# Patient Record
Sex: MALE | Race: WHITE | NOT HISPANIC OR LATINO | Employment: FULL TIME | URBAN - METROPOLITAN AREA
[De-identification: names, ages, dates, MRNs, and addresses within clinical notes are randomized per-mention and may not be internally consistent; named-entity substitution may affect disease eponyms.]

---

## 2022-02-22 ENCOUNTER — CLINICAL SUPPORT (OUTPATIENT)
Dept: OTHER | Facility: CLINIC | Age: 47
End: 2022-02-22

## 2022-02-22 DIAGNOSIS — Z00.8 ENCOUNTER FOR OTHER GENERAL EXAMINATION: ICD-10-CM

## 2022-02-24 VITALS — HEIGHT: 74 IN

## 2022-02-24 LAB
GLUCOSE SERPL-MCNC: 112 MG/DL (ref 60–140)
HDLC SERPL-MCNC: 42 MG/DL
POC CHOLESTEROL, LDL (DOCK): 113 MG/DL
POC CHOLESTEROL, TOTAL: 180 MG/DL
TRIGL SERPL-MCNC: 131 MG/DL

## 2023-09-26 DIAGNOSIS — M25.569 KNEE PAIN, UNSPECIFIED CHRONICITY, UNSPECIFIED LATERALITY: Primary | ICD-10-CM

## 2023-09-27 ENCOUNTER — OFFICE VISIT (OUTPATIENT)
Dept: ORTHOPEDICS | Facility: CLINIC | Age: 48
End: 2023-09-27
Payer: COMMERCIAL

## 2023-09-27 ENCOUNTER — HOSPITAL ENCOUNTER (OUTPATIENT)
Dept: RADIOLOGY | Facility: HOSPITAL | Age: 48
Discharge: HOME | End: 2023-09-27
Attending: ORTHOPAEDIC SURGERY
Payer: COMMERCIAL

## 2023-09-27 VITALS — RESPIRATION RATE: 18 BRPM | HEIGHT: 74 IN | BODY MASS INDEX: 25.03 KG/M2 | WEIGHT: 195 LBS

## 2023-09-27 DIAGNOSIS — S83.512A CHRONIC RUPTURE OF ACL OF LEFT KNEE: Primary | ICD-10-CM

## 2023-09-27 DIAGNOSIS — M25.569 KNEE PAIN, UNSPECIFIED CHRONICITY, UNSPECIFIED LATERALITY: ICD-10-CM

## 2023-09-27 DIAGNOSIS — M25.562 LEFT KNEE PAIN, UNSPECIFIED CHRONICITY: ICD-10-CM

## 2023-09-27 DIAGNOSIS — S83.282A TEAR OF LATERAL MENISCUS OF LEFT KNEE, UNSPECIFIED TEAR TYPE, UNSPECIFIED WHETHER OLD OR CURRENT TEAR, INITIAL ENCOUNTER: Primary | ICD-10-CM

## 2023-09-27 DIAGNOSIS — S83.282A ACUTE LATERAL MENISCAL TEAR, LEFT, INITIAL ENCOUNTER: ICD-10-CM

## 2023-09-27 PROCEDURE — 99999 PR PBB SHADOW E&M-EST. PATIENT-LVL III: CPT | Mod: PBBFAC,,, | Performed by: ORTHOPAEDIC SURGERY

## 2023-09-27 PROCEDURE — 73562 X-RAY EXAM OF KNEE 3: CPT | Mod: 59,TC,PO,RT

## 2023-09-27 PROCEDURE — 99999 PR PBB SHADOW E&M-EST. PATIENT-LVL III: ICD-10-PCS | Mod: PBBFAC,,, | Performed by: ORTHOPAEDIC SURGERY

## 2023-09-27 PROCEDURE — 99204 OFFICE O/P NEW MOD 45 MIN: CPT | Mod: S$GLB,,, | Performed by: ORTHOPAEDIC SURGERY

## 2023-09-27 PROCEDURE — 3008F BODY MASS INDEX DOCD: CPT | Mod: CPTII,S$GLB,, | Performed by: ORTHOPAEDIC SURGERY

## 2023-09-27 PROCEDURE — 99204 PR OFFICE/OUTPT VISIT, NEW, LEVL IV, 45-59 MIN: ICD-10-PCS | Mod: S$GLB,,, | Performed by: ORTHOPAEDIC SURGERY

## 2023-09-27 PROCEDURE — 73562 X-RAY EXAM OF KNEE 3: CPT | Mod: 26,RT,, | Performed by: RADIOLOGY

## 2023-09-27 PROCEDURE — 73562 XR KNEE ORTHO LEFT WITH FLEXION: ICD-10-PCS | Mod: 26,RT,, | Performed by: RADIOLOGY

## 2023-09-27 PROCEDURE — 1159F PR MEDICATION LIST DOCUMENTED IN MEDICAL RECORD: ICD-10-PCS | Mod: CPTII,S$GLB,, | Performed by: ORTHOPAEDIC SURGERY

## 2023-09-27 PROCEDURE — 3008F PR BODY MASS INDEX (BMI) DOCUMENTED: ICD-10-PCS | Mod: CPTII,S$GLB,, | Performed by: ORTHOPAEDIC SURGERY

## 2023-09-27 PROCEDURE — 73564 XR KNEE ORTHO LEFT WITH FLEXION: ICD-10-PCS | Mod: 26,LT,, | Performed by: RADIOLOGY

## 2023-09-27 PROCEDURE — 1159F MED LIST DOCD IN RCRD: CPT | Mod: CPTII,S$GLB,, | Performed by: ORTHOPAEDIC SURGERY

## 2023-09-27 PROCEDURE — 73564 X-RAY EXAM KNEE 4 OR MORE: CPT | Mod: 26,LT,, | Performed by: RADIOLOGY

## 2023-09-27 NOTE — PROGRESS NOTES
Past Medical History:   Diagnosis Date    Exercise-induced asthma 1/16/2012    GERD (gastroesophageal reflux disease) 1/16/2012    Seasonal allergies 1/16/2012       Past Surgical History:   Procedure Laterality Date    Knee surgery x2         Current Outpatient Medications   Medication Sig    cholecalciferol, vitamin D3, (VITAMIN D3) 25 mcg (1,000 unit) capsule Take 1,000 Units by mouth once daily.    ergocalciferol, vitamin D2, (VITAMIN D2 ORAL) Take by mouth.    omega-3 fatty acids/fish oil (FISH OIL-OMEGA-3 FATTY ACIDS) 300-1,000 mg capsule Take by mouth once daily.     No current facility-administered medications for this visit.       Review of patient's allergies indicates:  No Known Allergies    Family History   Problem Relation Age of Onset    Cancer Father     Asthma Sister        Social History     Socioeconomic History    Marital status:    Tobacco Use    Smoking status: Former    Smokeless tobacco: Never    Tobacco comments:     The patient smoked occasionally. Has not smoked in over a year.   Substance and Sexual Activity    Alcohol use: Yes     Comment: 3-4 times weekly    Drug use: No    Sexual activity: Yes     Partners: Female       Chief Complaint: No chief complaint on file.      History of present illness:  47-year-old male seen for left knee pain.  Patient has a history of an attempted ACL repair when he was an adolescent.  Patient's growth plates were still open so they could not do a reconstruction.  Unfortunately, the patient then developed an infection and they had to remove everything.  Patient has had worsening instability and swelling over the last few months.  Can not play basketball or run anymore.  Pain is up to 4/10.  Most of his pain is laterally located.      Review of Systems:    Constitution: Negative for chills, fever, and sweats.  Negative for unexplained weight loss.    HENT:  Negative for headaches and blurry vision.    Cardiovascular:Negative for chest pain or  irregular heart beat. Negative for hypertension.    Respiratory:  Negative for cough and shortness of breath.    Gastrointestinal: Negative for abdominal pain, heartburn, melena, nausea, and vomitting.    Genitourinary:  Negative bladder incontinence and dysuria.    Musculoskeletal:  See HPI    Neurological: Negative for numbness.    Psychiatric/Behavioral: Negative for depression.  The patient is not nervous/anxious.      Endocrine: Negative for polyuria    Hematologic/Lymphatic: Negative for bleeding problem.  Does not bruise/bleed easily.    Skin: Negative for poor would healing and rash      Physical Examination:    Vital Signs:  There were no vitals filed for this visit.    There is no height or weight on file to calculate BMI.    This a well-developed, well nourished patient in no acute distress.  They are alert and oriented and cooperative to examination.  Pt. walks without an antalgic gait.      Examination of the left knee shows no rashes or erythema. There are no masses ecchymosis or effusion.  Healed lateral surgical scar along the lateral aspect of his knee.  Patient has full range of motion from 0-130°. Patient is nontender to palpation over lateral joint line and markedly tender to palpation over the medial joint line. Patient has a 1+ Lachman exam, - anterior drawer exam, and - posterior drawer exam.  Positive lateral Apley exam. Knee is stable to varus and valgus stress. 5 out of 5 motor strength. Palpable distal pulses. Intact light touch sensation. Negative Patellofemoral crepitus      X-rays:  X-rays left knee are ordered and reviewed which show some heterotopic ossification along the lateral distal femur likely from prior surgical drilling.  Some mild to moderate medial narrowing.  Right greater than left.     Assessment::  Left lateral meniscal tear   Chronic left ACL instability    Plan:  I reviewed the findings with him today.  I recommended an MRI to evaluate particularly the lateral  meniscus.  Patient has a chronic unstable left knee from ACL injury as a child.  He now has worsening symptoms such as swelling and instability.  Follow-up after the MRI is completed.    All previous pertinent notes including ER visits, physical therapy visits, other orthopedic visits as well as other care for the same musculoskeletal problem were reviewed.  All pertinent lab values and previous imaging was reviewed pertinent to the current visit.    This note was created using [employer] voice recognition software that occasionally misinterpreted phrases or words.    Consult note is delivered via Epic messaging service.

## 2023-10-11 ENCOUNTER — OFFICE VISIT (OUTPATIENT)
Dept: ORTHOPEDICS | Facility: CLINIC | Age: 48
End: 2023-10-11
Payer: COMMERCIAL

## 2023-10-11 VITALS — WEIGHT: 195 LBS | RESPIRATION RATE: 18 BRPM | BODY MASS INDEX: 25.03 KG/M2 | HEIGHT: 74 IN

## 2023-10-11 DIAGNOSIS — S83.512A CHRONIC RUPTURE OF ACL OF LEFT KNEE: ICD-10-CM

## 2023-10-11 DIAGNOSIS — S83.282A TEAR OF LATERAL MENISCUS OF LEFT KNEE, UNSPECIFIED TEAR TYPE, UNSPECIFIED WHETHER OLD OR CURRENT TEAR, INITIAL ENCOUNTER: Primary | ICD-10-CM

## 2023-10-11 PROCEDURE — 1159F PR MEDICATION LIST DOCUMENTED IN MEDICAL RECORD: ICD-10-PCS | Mod: CPTII,S$GLB,, | Performed by: ORTHOPAEDIC SURGERY

## 2023-10-11 PROCEDURE — 3008F BODY MASS INDEX DOCD: CPT | Mod: CPTII,S$GLB,, | Performed by: ORTHOPAEDIC SURGERY

## 2023-10-11 PROCEDURE — 1159F MED LIST DOCD IN RCRD: CPT | Mod: CPTII,S$GLB,, | Performed by: ORTHOPAEDIC SURGERY

## 2023-10-11 PROCEDURE — 99999 PR PBB SHADOW E&M-EST. PATIENT-LVL III: CPT | Mod: PBBFAC,,, | Performed by: ORTHOPAEDIC SURGERY

## 2023-10-11 PROCEDURE — 99999 PR PBB SHADOW E&M-EST. PATIENT-LVL III: ICD-10-PCS | Mod: PBBFAC,,, | Performed by: ORTHOPAEDIC SURGERY

## 2023-10-11 PROCEDURE — 3044F HG A1C LEVEL LT 7.0%: CPT | Mod: CPTII,S$GLB,, | Performed by: ORTHOPAEDIC SURGERY

## 2023-10-11 PROCEDURE — 99214 OFFICE O/P EST MOD 30 MIN: CPT | Mod: 57,S$GLB,, | Performed by: ORTHOPAEDIC SURGERY

## 2023-10-11 PROCEDURE — 99214 PR OFFICE/OUTPT VISIT, EST, LEVL IV, 30-39 MIN: ICD-10-PCS | Mod: 57,S$GLB,, | Performed by: ORTHOPAEDIC SURGERY

## 2023-10-11 PROCEDURE — 3044F PR MOST RECENT HEMOGLOBIN A1C LEVEL <7.0%: ICD-10-PCS | Mod: CPTII,S$GLB,, | Performed by: ORTHOPAEDIC SURGERY

## 2023-10-11 PROCEDURE — 3008F PR BODY MASS INDEX (BMI) DOCUMENTED: ICD-10-PCS | Mod: CPTII,S$GLB,, | Performed by: ORTHOPAEDIC SURGERY

## 2023-10-11 NOTE — H&P (VIEW-ONLY)
Past Medical History:   Diagnosis Date    Exercise-induced asthma 1/16/2012    GERD (gastroesophageal reflux disease) 1/16/2012    Seasonal allergies 1/16/2012       Past Surgical History:   Procedure Laterality Date    Knee surgery x2         Current Outpatient Medications   Medication Sig    cholecalciferol, vitamin D3, (VITAMIN D3) 25 mcg (1,000 unit) capsule Take 1,000 Units by mouth once daily.    ergocalciferol, vitamin D2, (VITAMIN D2 ORAL) Take by mouth.    omega-3 fatty acids/fish oil (FISH OIL-OMEGA-3 FATTY ACIDS) 300-1,000 mg capsule Take by mouth once daily.     No current facility-administered medications for this visit.       Review of patient's allergies indicates:  No Known Allergies    Family History   Problem Relation Age of Onset    Cancer Father     Asthma Sister        Social History     Socioeconomic History    Marital status:    Tobacco Use    Smoking status: Former    Smokeless tobacco: Never    Tobacco comments:     The patient smoked occasionally. Has not smoked in over a year.   Substance and Sexual Activity    Alcohol use: Yes     Comment: 3-4 times weekly    Drug use: No    Sexual activity: Yes     Partners: Female       Chief Complaint: No chief complaint on file.      History of present illness:  47-year-old male seen for left knee pain.  Patient ha\s a history of an attempted ACL repair when he was an adolescent.  Patient's growth plates were still open so they could not do a reconstruction.  Unfortunately, the patient then developed an infection and they had to remove everything.  Patient has had worsening instability and swelling over the last few months.  Can not play basketball or run anymore.  Pain is up to 4/10.  Most of his pain is laterally located.      Review of Systems:  Musculoskeletal:  See HPI    Physical Examination:    Vital Signs:  There were no vitals filed for this visit.    There is no height or weight on file to calculate BMI.    This a well-developed, well  nourished patient in no acute distress.  They are alert and oriented and cooperative to examination.  Pt. walks without an antalgic gait.      Examination of the left knee shows no rashes or erythema. There are no masses ecchymosis or effusion.  Healed lateral surgical scar along the lateral aspect of his knee.  Patient has full range of motion from 0-130°. Patient is nontender to palpation over lateral joint line and markedly tender to palpation over the medial joint line. Patient has a 1+ Lachman exam, - anterior drawer exam, and - posterior drawer exam.  Positive lateral Apley exam. Knee is stable to varus and valgus stress. 5 out of 5 motor strength. Palpable distal pulses. Intact light touch sensation. Negative Patellofemoral crepitus    Heart is regular rate without obvious murmurs   Normal respiratory effort without audible wheezing  Abdomen is soft and nontender     X-rays:  X-rays left knee are  reviewed which show some heterotopic ossification along the lateral distal femur likely from prior surgical drilling.  Some mild to moderate medial narrowing.  Right greater than left.    MRI of the left knee is reviewed and interpreted:1. Complex tear of the posterior horn and body of the lateral meniscus.  2. Questionable tear of the medial meniscus anterior root.  There is no meniscal extrusion.  3. Thickening and increased signal of the anterior cruciate ligament could be secondary to age-indeterminate partial tear or sprain.     Assessment::  Left lateral meniscal tear   Possible root ligament medial meniscal tear  Chronic left ACL instability    Plan:  I reviewed the findings with him today.    Patient has a chronic unstable left knee from ACL injury as a child.  He now has worsening symptoms such as swelling and instability.  Talked about addressing the meniscus injuries.  I do not think he has a true root ligament injury of the medial meniscus as there is no extrusion and he does not have any medial joint  line pain.  Plan is for left knee arthroscopy with partial medial meniscectomy.  Risks, benefits, and alternatives to the procedure were explained to the patient including but not limited to damage to nerves, arteries, blood vessels, bones, tendons, ligaments, stiffness, instability, infection, permanent limb dysfunction, DVT, PE, as well as general anesthetic complications including seizure, stroke, heart attack and even death. The patient understood these risks and wished to proceed and signed the informed consent.       All previous pertinent notes including ER visits, physical therapy visits, other orthopedic visits as well as other care for the same musculoskeletal problem were reviewed.  All pertinent lab values and previous imaging was reviewed pertinent to the current visit.    This note was created using [employer] voice recognition software that occasionally misinterpreted phrases or words.    Consult note is delivered via Epic messaging service.

## 2023-10-11 NOTE — PROGRESS NOTES
Past Medical History:   Diagnosis Date    Exercise-induced asthma 1/16/2012    GERD (gastroesophageal reflux disease) 1/16/2012    Seasonal allergies 1/16/2012       Past Surgical History:   Procedure Laterality Date    Knee surgery x2         Current Outpatient Medications   Medication Sig    cholecalciferol, vitamin D3, (VITAMIN D3) 25 mcg (1,000 unit) capsule Take 1,000 Units by mouth once daily.    ergocalciferol, vitamin D2, (VITAMIN D2 ORAL) Take by mouth.    omega-3 fatty acids/fish oil (FISH OIL-OMEGA-3 FATTY ACIDS) 300-1,000 mg capsule Take by mouth once daily.     No current facility-administered medications for this visit.       Review of patient's allergies indicates:  No Known Allergies    Family History   Problem Relation Age of Onset    Cancer Father     Asthma Sister        Social History     Socioeconomic History    Marital status:    Tobacco Use    Smoking status: Former    Smokeless tobacco: Never    Tobacco comments:     The patient smoked occasionally. Has not smoked in over a year.   Substance and Sexual Activity    Alcohol use: Yes     Comment: 3-4 times weekly    Drug use: No    Sexual activity: Yes     Partners: Female       Chief Complaint: No chief complaint on file.      History of present illness:  47-year-old male seen for left knee pain.  Patient ha\s a history of an attempted ACL repair when he was an adolescent.  Patient's growth plates were still open so they could not do a reconstruction.  Unfortunately, the patient then developed an infection and they had to remove everything.  Patient has had worsening instability and swelling over the last few months.  Can not play basketball or run anymore.  Pain is up to 4/10.  Most of his pain is laterally located.      Review of Systems:  Musculoskeletal:  See HPI    Physical Examination:    Vital Signs:  There were no vitals filed for this visit.    There is no height or weight on file to calculate BMI.    This a well-developed, well  nourished patient in no acute distress.  They are alert and oriented and cooperative to examination.  Pt. walks without an antalgic gait.      Examination of the left knee shows no rashes or erythema. There are no masses ecchymosis or effusion.  Healed lateral surgical scar along the lateral aspect of his knee.  Patient has full range of motion from 0-130°. Patient is nontender to palpation over lateral joint line and markedly tender to palpation over the medial joint line. Patient has a 1+ Lachman exam, - anterior drawer exam, and - posterior drawer exam.  Positive lateral Apley exam. Knee is stable to varus and valgus stress. 5 out of 5 motor strength. Palpable distal pulses. Intact light touch sensation. Negative Patellofemoral crepitus    Heart is regular rate without obvious murmurs   Normal respiratory effort without audible wheezing  Abdomen is soft and nontender     X-rays:  X-rays left knee are  reviewed which show some heterotopic ossification along the lateral distal femur likely from prior surgical drilling.  Some mild to moderate medial narrowing.  Right greater than left.    MRI of the left knee is reviewed and interpreted:1. Complex tear of the posterior horn and body of the lateral meniscus.  2. Questionable tear of the medial meniscus anterior root.  There is no meniscal extrusion.  3. Thickening and increased signal of the anterior cruciate ligament could be secondary to age-indeterminate partial tear or sprain.     Assessment::  Left lateral meniscal tear   Possible root ligament medial meniscal tear  Chronic left ACL instability    Plan:  I reviewed the findings with him today.    Patient has a chronic unstable left knee from ACL injury as a child.  He now has worsening symptoms such as swelling and instability.  Talked about addressing the meniscus injuries.  I do not think he has a true root ligament injury of the medial meniscus as there is no extrusion and he does not have any medial joint  line pain.  Plan is for left knee arthroscopy with partial medial meniscectomy.  Risks, benefits, and alternatives to the procedure were explained to the patient including but not limited to damage to nerves, arteries, blood vessels, bones, tendons, ligaments, stiffness, instability, infection, permanent limb dysfunction, DVT, PE, as well as general anesthetic complications including seizure, stroke, heart attack and even death. The patient understood these risks and wished to proceed and signed the informed consent.       All previous pertinent notes including ER visits, physical therapy visits, other orthopedic visits as well as other care for the same musculoskeletal problem were reviewed.  All pertinent lab values and previous imaging was reviewed pertinent to the current visit.    This note was created using [employer] voice recognition software that occasionally misinterpreted phrases or words.    Consult note is delivered via Epic messaging service.

## 2023-10-13 DIAGNOSIS — S83.282A ACUTE LATERAL MENISCUS TEAR OF LEFT KNEE: ICD-10-CM

## 2023-10-13 DIAGNOSIS — S83.282A TEAR OF LATERAL MENISCUS OF LEFT KNEE, UNSPECIFIED TEAR TYPE, UNSPECIFIED WHETHER OLD OR CURRENT TEAR, INITIAL ENCOUNTER: Primary | ICD-10-CM

## 2023-10-13 RX ORDER — SODIUM CHLORIDE 9 MG/ML
INJECTION, SOLUTION INTRAVENOUS ONCE
Status: CANCELLED | OUTPATIENT
Start: 2023-10-13 | End: 2023-10-13

## 2023-10-25 ENCOUNTER — TELEPHONE (OUTPATIENT)
Dept: ORTHOPEDICS | Facility: CLINIC | Age: 48
End: 2023-10-25
Payer: COMMERCIAL

## 2023-10-25 NOTE — TELEPHONE ENCOUNTER
----- Message from Laurence Trimble LPN sent at 10/25/2023  2:10 PM CDT -----  Contact: pt [phone]    ----- Message -----  From: Jeanette Schmitt  Sent: 10/25/2023   2:08 PM CDT  To: [facility]ale[facility] Staff    Type: Needs Medical Advice  Who Called: Pt     Best Call Back Number: [phone]    Additional Information: [facility] [place] Pls call back and advise

## 2023-11-06 ENCOUNTER — PATIENT MESSAGE (OUTPATIENT)
Dept: SURGERY | Facility: HOSPITAL | Age: 48
End: 2023-11-06
Payer: COMMERCIAL

## 2023-11-07 ENCOUNTER — TELEPHONE (OUTPATIENT)
Dept: SURGERY | Facility: HOSPITAL | Age: 48
End: 2023-11-07
Payer: COMMERCIAL

## 2023-11-07 RX ORDER — ZINC GLUCONATE 100 MG
TABLET ORAL DAILY
COMMUNITY

## 2023-11-07 NOTE — TELEPHONE ENCOUNTER
Good afternoon,     Mr. Devries had a PT/INR and APTT drawn on 11/2/23. He is wondering if Dr. Dukes requires any further lab work prior to his upcoming surgery on Friday, 11/10. Please contact Mr. Devries to discuss.     Thank you!

## 2023-11-09 ENCOUNTER — ANESTHESIA EVENT (OUTPATIENT)
Dept: SURGERY | Facility: HOSPITAL | Age: 48
End: 2023-11-09
Payer: COMMERCIAL

## 2023-11-10 ENCOUNTER — ANESTHESIA (OUTPATIENT)
Dept: SURGERY | Facility: HOSPITAL | Age: 48
End: 2023-11-10
Payer: COMMERCIAL

## 2023-11-10 ENCOUNTER — HOSPITAL ENCOUNTER (OUTPATIENT)
Facility: HOSPITAL | Age: 48
Discharge: HOME | End: 2023-11-10
Attending: ORTHOPAEDIC SURGERY | Admitting: ORTHOPAEDIC SURGERY
Payer: COMMERCIAL

## 2023-11-10 DIAGNOSIS — S83.282A TEAR OF LATERAL MENISCUS OF LEFT KNEE, UNSPECIFIED TEAR TYPE, UNSPECIFIED WHETHER OLD OR CURRENT TEAR, INITIAL ENCOUNTER: ICD-10-CM

## 2023-11-10 DIAGNOSIS — S83.282A ACUTE LATERAL MENISCUS TEAR OF LEFT KNEE: ICD-10-CM

## 2023-11-10 PROCEDURE — 25000003 PHARM REV CODE 250: Mod: PO | Performed by: ORTHOPAEDIC SURGERY

## 2023-11-10 PROCEDURE — 71000016 HC POSTOP RECOV ADDL HR: Mod: PO | Performed by: ORTHOPAEDIC SURGERY

## 2023-11-10 PROCEDURE — 71000015 HC POSTOP RECOV 1ST HR: Mod: PO | Performed by: ORTHOPAEDIC SURGERY

## 2023-11-10 PROCEDURE — D9220A PRA ANESTHESIA: ICD-10-PCS | Mod: ANES,,, | Performed by: ANESTHESIOLOGY

## 2023-11-10 PROCEDURE — 37000008 HC ANESTHESIA 1ST 15 MINUTES: Mod: PO | Performed by: ORTHOPAEDIC SURGERY

## 2023-11-10 PROCEDURE — 29881 PR KNEE SCOPE SINGLE MENISECECTOMY: ICD-10-PCS | Mod: LT,,, | Performed by: ORTHOPAEDIC SURGERY

## 2023-11-10 PROCEDURE — 63600175 PHARM REV CODE 636 W HCPCS: Mod: PO | Performed by: ANESTHESIOLOGY

## 2023-11-10 PROCEDURE — 63600175 PHARM REV CODE 636 W HCPCS: Mod: PO | Performed by: ORTHOPAEDIC SURGERY

## 2023-11-10 PROCEDURE — 63600175 PHARM REV CODE 636 W HCPCS: Mod: PO | Performed by: NURSE ANESTHETIST, CERTIFIED REGISTERED

## 2023-11-10 PROCEDURE — D9220A PRA ANESTHESIA: Mod: ANES,,, | Performed by: ANESTHESIOLOGY

## 2023-11-10 PROCEDURE — 27200651 HC AIRWAY, LMA: Mod: PO | Performed by: ANESTHESIOLOGY

## 2023-11-10 PROCEDURE — D9220A PRA ANESTHESIA: Mod: CRNA,,, | Performed by: NURSE ANESTHETIST, CERTIFIED REGISTERED

## 2023-11-10 PROCEDURE — D9220A PRA ANESTHESIA: ICD-10-PCS | Mod: CRNA,,, | Performed by: NURSE ANESTHETIST, CERTIFIED REGISTERED

## 2023-11-10 PROCEDURE — 36000710: Mod: PO | Performed by: ORTHOPAEDIC SURGERY

## 2023-11-10 PROCEDURE — 29881 ARTHRS KNE SRG MNISECTMY M/L: CPT | Mod: LT,,, | Performed by: ORTHOPAEDIC SURGERY

## 2023-11-10 PROCEDURE — 71000033 HC RECOVERY, INTIAL HOUR: Mod: PO | Performed by: ORTHOPAEDIC SURGERY

## 2023-11-10 PROCEDURE — 27201423 OPTIME MED/SURG SUP & DEVICES STERILE SUPPLY: Mod: PO | Performed by: ORTHOPAEDIC SURGERY

## 2023-11-10 PROCEDURE — 36000711: Mod: PO | Performed by: ORTHOPAEDIC SURGERY

## 2023-11-10 PROCEDURE — 25000003 PHARM REV CODE 250: Mod: PO | Performed by: NURSE ANESTHETIST, CERTIFIED REGISTERED

## 2023-11-10 PROCEDURE — 37000009 HC ANESTHESIA EA ADD 15 MINS: Mod: PO | Performed by: ORTHOPAEDIC SURGERY

## 2023-11-10 RX ORDER — HYDROMORPHONE HYDROCHLORIDE 2 MG/ML
0.2 INJECTION, SOLUTION INTRAMUSCULAR; INTRAVENOUS; SUBCUTANEOUS EVERY 5 MIN PRN
Status: ACTIVE | OUTPATIENT
Start: 2023-11-10

## 2023-11-10 RX ORDER — HYDROCODONE BITARTRATE AND ACETAMINOPHEN 5; 325 MG/1; MG/1
1 TABLET ORAL EVERY 4 HOURS PRN
Status: DISCONTINUED | OUTPATIENT
Start: 2023-11-10 | End: 2023-11-10 | Stop reason: HOSPADM

## 2023-11-10 RX ORDER — MEPERIDINE HYDROCHLORIDE 50 MG/ML
12.5 INJECTION INTRAMUSCULAR; INTRAVENOUS; SUBCUTANEOUS ONCE
Status: ACTIVE | OUTPATIENT
Start: 2023-11-10 | End: 2023-11-11

## 2023-11-10 RX ORDER — ACETAMINOPHEN 500 MG
1000 TABLET ORAL EVERY 8 HOURS PRN
Qty: 30 TABLET | Refills: 0 | Status: SHIPPED | OUTPATIENT
Start: 2023-11-10 | End: 2023-11-22

## 2023-11-10 RX ORDER — ACETAMINOPHEN 10 MG/ML
INJECTION, SOLUTION INTRAVENOUS
Status: DISCONTINUED | OUTPATIENT
Start: 2023-11-10 | End: 2023-11-10

## 2023-11-10 RX ORDER — PROCHLORPERAZINE EDISYLATE 5 MG/ML
5 INJECTION INTRAMUSCULAR; INTRAVENOUS EVERY 4 HOURS PRN
Status: ACTIVE | OUTPATIENT
Start: 2023-11-10

## 2023-11-10 RX ORDER — SODIUM CHLORIDE 9 MG/ML
INJECTION, SOLUTION INTRAVENOUS ONCE
Status: DISCONTINUED | OUTPATIENT
Start: 2023-11-10 | End: 2023-11-10 | Stop reason: HOSPADM

## 2023-11-10 RX ORDER — BUPIVACAINE HYDROCHLORIDE AND EPINEPHRINE 5; 5 MG/ML; UG/ML
INJECTION, SOLUTION EPIDURAL; INTRACAUDAL; PERINEURAL
Status: DISCONTINUED | OUTPATIENT
Start: 2023-11-10 | End: 2023-11-10 | Stop reason: HOSPADM

## 2023-11-10 RX ORDER — LIDOCAINE HYDROCHLORIDE 10 MG/ML
1 INJECTION, SOLUTION EPIDURAL; INFILTRATION; INTRACAUDAL; PERINEURAL ONCE
Status: ACTIVE | OUTPATIENT
Start: 2023-11-10

## 2023-11-10 RX ORDER — IBUPROFEN 800 MG/1
800 TABLET ORAL EVERY 6 HOURS PRN
Qty: 30 TABLET | Refills: 0 | Status: SHIPPED | OUTPATIENT
Start: 2023-11-10 | End: 2023-12-20

## 2023-11-10 RX ORDER — OXYCODONE HYDROCHLORIDE 5 MG/1
5 TABLET ORAL
Status: ACTIVE | OUTPATIENT
Start: 2023-11-10

## 2023-11-10 RX ORDER — ONDANSETRON 4 MG/1
4 TABLET, ORALLY DISINTEGRATING ORAL EVERY 8 HOURS PRN
Qty: 30 TABLET | Refills: 0 | Status: SHIPPED | OUTPATIENT
Start: 2023-11-10 | End: 2023-11-22

## 2023-11-10 RX ORDER — ONDANSETRON 2 MG/ML
INJECTION INTRAMUSCULAR; INTRAVENOUS
Status: DISCONTINUED | OUTPATIENT
Start: 2023-11-10 | End: 2023-11-10

## 2023-11-10 RX ORDER — MIDAZOLAM HYDROCHLORIDE 1 MG/ML
INJECTION, SOLUTION INTRAMUSCULAR; INTRAVENOUS
Status: DISCONTINUED | OUTPATIENT
Start: 2023-11-10 | End: 2023-11-10

## 2023-11-10 RX ORDER — SODIUM CHLORIDE 0.9 G/100ML
IRRIGANT IRRIGATION
Status: DISCONTINUED | OUTPATIENT
Start: 2023-11-10 | End: 2023-11-10 | Stop reason: HOSPADM

## 2023-11-10 RX ORDER — SODIUM CHLORIDE 0.9 % (FLUSH) 0.9 %
10 SYRINGE (ML) INJECTION ONCE
Status: DISPENSED | OUTPATIENT
Start: 2023-11-10

## 2023-11-10 RX ORDER — PROPOFOL 10 MG/ML
VIAL (ML) INTRAVENOUS
Status: DISCONTINUED | OUTPATIENT
Start: 2023-11-10 | End: 2023-11-10

## 2023-11-10 RX ORDER — KETOROLAC TROMETHAMINE 30 MG/ML
INJECTION, SOLUTION INTRAMUSCULAR; INTRAVENOUS
Status: DISCONTINUED | OUTPATIENT
Start: 2023-11-10 | End: 2023-11-10

## 2023-11-10 RX ORDER — CYCLOBENZAPRINE HCL 10 MG
10 TABLET ORAL 3 TIMES DAILY PRN
Qty: 30 TABLET | Refills: 0 | Status: SHIPPED | OUTPATIENT
Start: 2023-11-10 | End: 2023-11-22

## 2023-11-10 RX ORDER — SODIUM CHLORIDE, SODIUM LACTATE, POTASSIUM CHLORIDE, CALCIUM CHLORIDE 600; 310; 30; 20 MG/100ML; MG/100ML; MG/100ML; MG/100ML
INJECTION, SOLUTION INTRAVENOUS CONTINUOUS
Status: DISPENSED | OUTPATIENT
Start: 2023-11-10

## 2023-11-10 RX ORDER — FENTANYL CITRATE 50 UG/ML
INJECTION, SOLUTION INTRAMUSCULAR; INTRAVENOUS
Status: DISCONTINUED | OUTPATIENT
Start: 2023-11-10 | End: 2023-11-10

## 2023-11-10 RX ORDER — LIDOCAINE HYDROCHLORIDE 20 MG/ML
INJECTION INTRAVENOUS
Status: DISCONTINUED | OUTPATIENT
Start: 2023-11-10 | End: 2023-11-10

## 2023-11-10 RX ORDER — DEXAMETHASONE SODIUM PHOSPHATE 4 MG/ML
INJECTION, SOLUTION INTRA-ARTICULAR; INTRALESIONAL; INTRAMUSCULAR; INTRAVENOUS; SOFT TISSUE
Status: DISCONTINUED | OUTPATIENT
Start: 2023-11-10 | End: 2023-11-10

## 2023-11-10 RX ORDER — ASPIRIN 81 MG/1
81 TABLET ORAL 2 TIMES DAILY
Refills: 0 | COMMUNITY
Start: 2023-11-10 | End: 2024-11-09

## 2023-11-10 RX ORDER — DIPHENHYDRAMINE HYDROCHLORIDE 50 MG/ML
25 INJECTION INTRAMUSCULAR; INTRAVENOUS EVERY 6 HOURS PRN
Status: ACTIVE | OUTPATIENT
Start: 2023-11-10

## 2023-11-10 RX ORDER — CEFAZOLIN SODIUM 2 G/50ML
2 SOLUTION INTRAVENOUS
Status: COMPLETED | OUTPATIENT
Start: 2023-11-10 | End: 2023-11-10

## 2023-11-10 RX ORDER — HYDROCODONE BITARTRATE AND ACETAMINOPHEN 5; 325 MG/1; MG/1
1 TABLET ORAL EVERY 6 HOURS PRN
Qty: 10 TABLET | Refills: 0 | Status: SHIPPED | OUTPATIENT
Start: 2023-11-10 | End: 2023-11-22

## 2023-11-10 RX ADMIN — LIDOCAINE HYDROCHLORIDE 75 MG: 20 INJECTION INTRAVENOUS at 08:11

## 2023-11-10 RX ADMIN — PROPOFOL 170 MG: 10 INJECTION, EMULSION INTRAVENOUS at 08:11

## 2023-11-10 RX ADMIN — FENTANYL CITRATE 50 MCG: 50 INJECTION, SOLUTION INTRAMUSCULAR; INTRAVENOUS at 08:11

## 2023-11-10 RX ADMIN — DEXAMETHASONE SODIUM PHOSPHATE 4 MG: 4 INJECTION, SOLUTION INTRAMUSCULAR; INTRAVENOUS at 08:11

## 2023-11-10 RX ADMIN — SODIUM CHLORIDE, POTASSIUM CHLORIDE, SODIUM LACTATE AND CALCIUM CHLORIDE: 600; 310; 30; 20 INJECTION, SOLUTION INTRAVENOUS at 07:11

## 2023-11-10 RX ADMIN — CEFAZOLIN SODIUM 2 G: 2 SOLUTION INTRAVENOUS at 08:11

## 2023-11-10 RX ADMIN — KETOROLAC TROMETHAMINE 30 MG: 30 INJECTION, SOLUTION INTRAMUSCULAR at 08:11

## 2023-11-10 RX ADMIN — MIDAZOLAM HYDROCHLORIDE 2 MG: 1 INJECTION, SOLUTION INTRAMUSCULAR; INTRAVENOUS at 08:11

## 2023-11-10 RX ADMIN — ONDANSETRON 4 MG: 2 INJECTION, SOLUTION INTRAMUSCULAR; INTRAVENOUS at 08:11

## 2023-11-10 RX ADMIN — FENTANYL CITRATE 50 MCG: 50 INJECTION, SOLUTION INTRAMUSCULAR; INTRAVENOUS at 09:11

## 2023-11-10 RX ADMIN — ACETAMINOPHEN 1000 MG: 10 INJECTION, SOLUTION INTRAVENOUS at 08:11

## 2023-11-10 NOTE — DISCHARGE INSTRUCTIONS
1.Diet: Ice chips, clear liquids, and then diet as tolerated. Drink plenty of liquids.  2.Ice the area at least three times a day (20 minutes per session).  3.Elevate the extremity above the level of the heart to help reduce swelling.  4.Pain medication can be taken every four to six hours as needed. It is helpful to take pain medication prior to physical therapy.  Use Tylenol or anti-inflammatories for pain as much as possible.  Pain medication is for pain not responsive to over-the-counter medications.  5.Any activity that requires precise thinking or accuracy should be avoided for a minimum of 72 hours after surgery and while on narcotic pain medication. This includes operating machinery and/or driving a vehicle.  6.All sutures/staples will be removed approximately 14 days from the time of surgery. Leave steri-strips (skin tapes) in place until sutures are removed.  7. If skin glue is used instead of stitches, do not apply ointments or solutions to the incision. Keep the incision dry. The skin glue will peel off in 3-4 weeks.  8. Change dressing on the first post-op day. Use gauze for the first 3 days, then start using Band-Aids over the incision sites.   9. All casts, splints, braces, slings, crutches, abduction pillows, etc... Are to be worn as instructed. Crutches are just to be used as needed. If not needed for soreness or balance, may weight bear as tolerated without the crutches.  10. Keep the incision dry for 10-14 days. A waterproof dressing (purchase at [employer], [employer], etc) can be used to shower. No bath, pool, hot tub until instructed.  11. Call [phone] with any questions or concerns.

## 2023-11-10 NOTE — OP NOTE
[facility]  Orthopedic Surgery  Operative Note    SUMMARY     Date of Procedure: 11/10/2023     Procedure: Procedure(s) (LRB):  ARTHROSCOPY, KNEE, WITH LATERAL MENISCECTOMY (Left)       Surgeon(s) and Role:     * Ger Dukes MD - Primary    Assistant: Jeremiah SHAW    Pre-Operative Diagnosis: Tear of lateral meniscus of left knee, unspecified tear type, unspecified whether old or current tear, initial encounter [S83.282A]    Post-Operative Diagnosis: Post-Op Diagnosis Codes:     * Tear of lateral meniscus of left knee, unspecified tear type, unspecified whether old or current tear, initial encounter [S83.282A]    Anesthesia: Choice    Diagnostic arthroscopy findings: Diagnostic arthroscopy findings, the patient's medial compartment was thoroughly examined. The patient had no cartilage wear and no distinct meniscal tear. ACL graft and PCL were both intact with   good tension. Lateral compartment showed some undersurface tearing of the posterior horn.  It appeared that the patient had a previous longitudinal tear of the posterior horn but it had healed for the most part.  Did have some degenerative looking meniscal tissue centrally.  Body and anterior horn looked perfect.  Patient did have some femoral articular wear posteriorly in the weight-bearing portion of the central femoral condyle that measured about 2 centimeters x 2 centimeters and was full-thickness down to subchondral bone.  Remainder of the condyle looked good.  In the patellofemoral joint, the patient had good central tracking without tilt or chondromalacia    Complications: No    Estimated Blood Loss (EBL): 1ml    Tourniquet Time: 12min at 300mmHg           Implants: * No implants in log *    Specimens:   Specimen (24h ago, onward)      None                    Condition: Good    Disposition: PACU - hemodynamically stable.    Attestation: I was present and scrubbed for the entire procedure.    INDICATIONS FOR THE PROCEDURE:   47-year-old male with a history of previous ACL reconstruction has been having worsening lateral-sided knee pain.  Patient had a new MRI which showed possible new lateral meniscal tear.  Patient failed conservative management wished to proceed with the procedure listed above.    PROCEDURE IN DETAIL: Risks, benefits and alternatives of the procedure were   explained to the patient including, but not limited to damage to nerves,   arteries, blood vessels. Also explained risk of infection, DVT, PE, continued pain due to arthritis,  as well as   anesthetic complications including seizure, stroke, heart attack and death. They  understood this and signed informed consent. The patient's Left knee was marked prior to coming to the Operating Room. Once there a formal   timeout was done in which correct patient, procedure and op site were all   correctly identified and confirmed by the entire operating team.  Appropriate preoperative antibiotic was   given prior to surgical incision. Laryngeal mask anesthesia was induced. The   patient's Left lower extremity was prepped and draped in normal sterile fashion.   Leg was then exsanguinated with a tourniquet and tourniquet was inflated up   300 mmHg. Standard inferior lateral portal was then made. A spinal needle was   used to localize an inferior medial portal and this was made under direct   arthroscopic visualization. Diagnostic arthroscopy was then performed with the   findings listed above. Shaver was used to remove redundant fat pad and   Synovium within the notch. A combination of arthroscopic biters and 3.5mm full radius shaver was used to perform a partial menisectomy back to stable healthy appearing tissue.  We then used a meniscal rasp to rasp what appeared to be the old tear both on the superior surface and the undersurface to possibly stimulate a healing response.  Mild chondroplasty of the lateral femoral condyle was performed just removing loose chondral flaps along  the margins.      Proceeded with closing. All   excess water was removed from the knee joint. Portals were closed using   nylons. Portal was then injected with 0.25% Marcaine with epinephrine. Sterile   dressing was then applied. They were then extubated and awakened and transferred   from the Operating Room to the Recovery Room in stable condition.     Postop course is for an arthroscopic partial lateral meniscectomy

## 2023-11-10 NOTE — ANESTHESIA PREPROCEDURE EVALUATION
"                                                                                                             11/10/2023  Saqib Rodriguez is a 47 y.o., male.  Patient Active Problem List   Diagnosis    GERD (gastroesophageal reflux disease)    Seasonal allergies    Exercise-induced asthma     Past Surgical History:   Procedure Laterality Date    BIOPSY OF THYROID      x2    Knee surgery x2 Left      Past Medical History:   Diagnosis Date    Exercise-induced asthma 01/16/2012    GERD (gastroesophageal reflux disease) 01/16/2012    Seasonal allergies 01/16/2012    Thyroid disease 11/07/2023    "mass in thyroid"; atypical, under observation           Pre-op Assessment          Review of Systems      Physical Exam    Airway:  No airway management difficulties anticipated  Dental:No active dental issues noted  Chest/Lungs:  Clear to auscultation    Heart:  Rate: Normal  Rhythm: Regular Rhythm  Sounds: Normal        Anesthesia Plan  Type of Anesthesia, risks & benefits discussed:    Anesthesia Type: Gen Supraglottic Airway  Informed Consent: Informed consent signed with the Patient and all parties understand the risks and agree with anesthesia plan.  All questions answered.   ASA Score: 2  Anesthesia Plan Notes: Chart reviewed. Patient seen and examined. Anesthesia plan discussed and questions answered. E-consent signed. Carlton Gandhi MD    Ready For Surgery From Anesthesia Perspective.     .      "

## 2023-11-10 NOTE — ANESTHESIA PROCEDURE NOTES
Intubation    Date/Time: 11/10/2023 8:28 AM    Performed by: Bobo Washington CRNA  Authorized by: Carlton Gandhi MD    Intubation:     Induction:  Intravenous    Intubated:  Postinduction    Mask Ventilation:  Easy mask    Attempts:  1    Attempted By:  CRNA    Difficult Airway Encountered?: No      Complications:  None    Airway Device:  Supraglottic airway/LMA    Airway Device Size:  5.0    Style/Cuff Inflation:  Cuffed    Inflation Amount (mL):  38    Secured at:  The lips    Placement Verified By:  Capnometry    Complicating Factors:  None    Findings Post-Intubation:  BS equal bilateral and atraumatic/condition of teeth unchanged

## 2023-11-10 NOTE — ANESTHESIA POSTPROCEDURE EVALUATION
Anesthesia Post Evaluation    Patient: Saqib Rodriguez    Procedure(s) Performed: Procedure(s) (LRB):  ARTHROSCOPY, KNEE, WITH LATERAL MENISCECTOMY (Left)    Final Anesthesia Type: general      Level of consciousness: awake and alert  Post-procedure vital signs: reviewed and stable  Pain control: Pain has been treated.  Airway patency: patent    PONV status: Absent or treated.  Anesthetic complications: no      Cardiovascular status: hemodynamically stable  Respiratory status: unassisted  Hydration status: euvolemic            Vitals Value Taken Time   /58 11/10/23 0955   Temp 36.7 °C (98 °F) 11/10/23 0908   Pulse 65 11/10/23 0955   Resp 16 11/10/23 0955   SpO2 99 % 11/10/23 0955         Event Time   Out of Recovery 09:16:11         Pain/Michael Score: Michael Score: 10 (11/10/2023  9:20 AM)

## 2023-11-10 NOTE — TRANSFER OF CARE
"Anesthesia Transfer of Care Note    Patient: Saqib Rodriguez    Procedure(s) Performed: Procedure(s) (LRB):  ARTHROSCOPY, KNEE, WITH LATERAL MENISCECTOMY (Left)    Patient location: PACU    Anesthesia Type: general    Transport from OR: Transported from OR on room air with adequate spontaneous ventilation    Post pain: adequate analgesia    Post assessment: no apparent anesthetic complications and tolerated procedure well    Post vital signs: stable    Level of consciousness: awake and alert    Nausea/Vomiting: no nausea/vomiting    Complications: none    Transfer of care protocol was followed      Last vitals:   Visit Vitals  /60 (BP Location: Right arm, Patient Position: Lying)   Pulse (!) 55   Temp 36.7 °C (98 °F) (Skin)   Resp 16   Ht 6' 2" (1.88 m)   Wt 83.9 kg (185 lb)   SpO2 99%   BMI 23.75 kg/m²     "

## 2023-11-10 NOTE — DISCHARGE SUMMARY
[facility] - [facility]  Discharge Note  Short Stay    Procedure(s) (LRB):  ARTHROSCOPY, KNEE, WITH LATERAL MENISCECTOMY (Left)      OUTCOME: Patient tolerated treatment/procedure well without complication and is now ready for discharge.    DISPOSITION: Home or Self Care    FINAL DIAGNOSIS:  <principal problem not specified>    FOLLOWUP: In clinic    DISCHARGE INSTRUCTIONS:    Discharge Procedure Orders   Diet general     Keep surgical extremity elevated     Ice to affected area   Order Comments: using barrier between ice and skin (specify duration&frequency)     Remove dressing in 24 hours     Change dressing (specify)   Order Comments: Dressing change: as needed with waterproof bandaids     Call MD for:  temperature >100.4     Call MD for:  persistent nausea and vomiting     Call MD for:  severe uncontrolled pain     Call MD for:  difficulty breathing, headache or visual disturbances     Call MD for:  redness, tenderness, or signs of infection (pain, swelling, redness, odor or green/yellow discharge around incision site)     Call MD for:  hives     Call MD for:  persistent dizziness or light-headedness     Call MD for:  extreme fatigue     Activity as tolerated     Shower on day dressing removed (No bath)     Weight bearing as tolerated        TIME SPENT ON DISCHARGE: 5 minutes

## 2023-11-13 VITALS
WEIGHT: 185 LBS | SYSTOLIC BLOOD PRESSURE: 100 MMHG | OXYGEN SATURATION: 99 % | HEART RATE: 65 BPM | TEMPERATURE: 98 F | RESPIRATION RATE: 16 BRPM | HEIGHT: 74 IN | DIASTOLIC BLOOD PRESSURE: 58 MMHG | BODY MASS INDEX: 23.74 KG/M2

## 2023-11-22 ENCOUNTER — OFFICE VISIT (OUTPATIENT)
Dept: ORTHOPEDICS | Facility: CLINIC | Age: 48
End: 2023-11-22
Payer: COMMERCIAL

## 2023-11-22 ENCOUNTER — TELEPHONE (OUTPATIENT)
Dept: HEMATOLOGY/ONCOLOGY | Facility: CLINIC | Age: 48
End: 2023-11-22
Payer: COMMERCIAL

## 2023-11-22 DIAGNOSIS — S83.282A TEAR OF LATERAL MENISCUS OF LEFT KNEE, UNSPECIFIED TEAR TYPE, UNSPECIFIED WHETHER OLD OR CURRENT TEAR, INITIAL ENCOUNTER: Primary | ICD-10-CM

## 2023-11-22 DIAGNOSIS — E04.1 LEFT THYROID NODULE: Primary | ICD-10-CM

## 2023-11-22 DIAGNOSIS — S83.272D COMPLEX TEAR OF LATERAL MENISCUS OF LEFT KNEE AS CURRENT INJURY, SUBSEQUENT ENCOUNTER: Primary | ICD-10-CM

## 2023-11-22 PROCEDURE — 99024 PR POST-OP FOLLOW-UP VISIT: ICD-10-PCS | Mod: S$GLB,,, | Performed by: ORTHOPAEDIC SURGERY

## 2023-11-22 PROCEDURE — 99024 POSTOP FOLLOW-UP VISIT: CPT | Mod: S$GLB,,, | Performed by: ORTHOPAEDIC SURGERY

## 2023-11-22 PROCEDURE — 1160F PR REVIEW ALL MEDS BY PRESCRIBER/CLIN PHARMACIST DOCUMENTED: ICD-10-PCS | Mod: CPTII,S$GLB,, | Performed by: ORTHOPAEDIC SURGERY

## 2023-11-22 PROCEDURE — 3044F PR MOST RECENT HEMOGLOBIN A1C LEVEL <7.0%: ICD-10-PCS | Mod: CPTII,S$GLB,, | Performed by: ORTHOPAEDIC SURGERY

## 2023-11-22 PROCEDURE — 99999 PR PBB SHADOW E&M-EST. PATIENT-LVL II: CPT | Mod: PBBFAC,,, | Performed by: ORTHOPAEDIC SURGERY

## 2023-11-22 PROCEDURE — 3044F HG A1C LEVEL LT 7.0%: CPT | Mod: CPTII,S$GLB,, | Performed by: ORTHOPAEDIC SURGERY

## 2023-11-22 PROCEDURE — 99999 PR PBB SHADOW E&M-EST. PATIENT-LVL II: ICD-10-PCS | Mod: PBBFAC,,, | Performed by: ORTHOPAEDIC SURGERY

## 2023-11-22 PROCEDURE — 1159F PR MEDICATION LIST DOCUMENTED IN MEDICAL RECORD: ICD-10-PCS | Mod: CPTII,S$GLB,, | Performed by: ORTHOPAEDIC SURGERY

## 2023-11-22 PROCEDURE — 1159F MED LIST DOCD IN RCRD: CPT | Mod: CPTII,S$GLB,, | Performed by: ORTHOPAEDIC SURGERY

## 2023-11-22 PROCEDURE — 1160F RVW MEDS BY RX/DR IN RCRD: CPT | Mod: CPTII,S$GLB,, | Performed by: ORTHOPAEDIC SURGERY

## 2023-11-22 NOTE — PROGRESS NOTES
"  Past Medical History:   Diagnosis Date    Exercise-induced asthma 01/16/2012    GERD (gastroesophageal reflux disease) 01/16/2012    Seasonal allergies 01/16/2012    Thyroid disease 11/07/2023    "mass in thyroid"; atypical, under observation       Past Surgical History:   Procedure Laterality Date    BIOPSY OF THYROID      x2    KNEE ARTHROSCOPY W/ MENISCECTOMY Left 11/10/2023    Procedure: ARTHROSCOPY, KNEE, WITH LATERAL MENISCECTOMY;  Surgeon: Ger Dukes MD;  Location: [facility];  Service: Orthopedics;  Laterality: Left;  possible menisectomy or meniscus repair    Knee surgery x2 Left        Current Outpatient Medications   Medication Sig    aspirin (ECOTRIN) 81 MG EC tablet Take 1 tablet (81 mg total) by mouth 2 (two) times daily.    cholecalciferol, vitamin D3, (VITAMIN D3) 25 mcg (1,000 unit) capsule Take 10,000 Units by mouth once daily.    ibuprofen (ADVIL,MOTRIN) 800 MG tablet Take 1 tablet (800 mg total) by mouth every 6 (six) hours as needed for Pain.    multivitamin capsule Take 1 capsule by mouth once daily.    omega-3 fatty acids/fish oil (FISH OIL-OMEGA-3 FATTY ACIDS) 300-1,000 mg capsule Take by mouth once daily.    phytonadione, vit K1, (PHYTONADIONE, VITAMIN K1,) 100 mcg Tab Take by mouth once daily.     No current facility-administered medications for this visit.     Facility-Administered Medications Ordered in Other Visits   Medication    diphenhydrAMINE injection 25 mg    HYDROmorphone (PF) injection 0.2 mg    lactated ringers infusion    LIDOcaine (PF) 10 mg/ml (1%) injection 10 mg    oxyCODONE immediate release tablet 5 mg    prochlorperazine injection Soln 5 mg    sodium chloride 0.9% flush 10 mL       Review of patient's allergies indicates:  No Known Allergies    Family History   Problem Relation Age of Onset    Cancer Father     Asthma Sister        Social History     Socioeconomic History    Marital status:    Tobacco Use    Smoking status: Former    Smokeless tobacco: " Never    Tobacco comments:     The patient smoked occasionally. Has not smoked in over a year.   Substance and Sexual Activity    Alcohol use: Not Currently    Drug use: No    Sexual activity: Yes     Partners: Female       Chief Complaint: No chief complaint on file.      Date of surgery:  November 10, 2000 23    History of present illness:  This is a 48-year-old male underwent left partial lateral meniscectomy.  Patient had an old ACL injury.  Patient had some femoral cartilage thinning as well.  Patient is doing pretty well.  Pain is a 3/10.  No wound issues.      Review of Systems:    Musculoskeletal:  See HPI        Physical Examination:    Vital Signs:  There were no vitals filed for this visit.    There is no height or weight on file to calculate BMI.    This a well-developed, well nourished patient in no acute distress.  They are alert and oriented and cooperative to examination.  Pt. walks without an antalgic gait.      Examination left knee shows well-healing surgical portals.  No erythema drainage.  No significant joint effusion.  Patient has full range of motion.  No calf pain.    X-rays:  None     Assessment::  Status post partial lateral meniscectomy    Plan:  I reviewed the arthroscopic photos with him today.  We spent a lot of time talking about his exercise regimen and what he should and should not do.  Patient elected to do some formal physical therapy.  We took out his stitches.  I will see him back in a month.    This note was created using [employer] voice recognition software that occasionally misinterpreted phrases or words.

## 2023-11-22 NOTE — NURSING
Spoke with patient regarding referral for Dr. Marie for thyroid nodule. Appt info and location provided to him and he verbalized understanding    Oncology Navigation   Intake  Cancer Type: Head and Neck  Internal / External Referral: External  Date of Referral: 23  Initial Nurse Navigator Contact: 23  Referral to Initial Contact Timeline (days): 0  Date Worked: 23  First Appointment Available: 23  Appointment Date: 23  First Available Date vs. Scheduled Date (days): 0     Treatment  Current Status: Staging work-up    Surgical Oncologist: Dr. Anita Marie ([facility] Surg Onc)  Consult Date: 23          Procedures: Biopsy; Ultrasound  Biopsy Schedule Date: 23  Ultrasound Schedule Date: 23                 Acuity  Stage: 0  ECO  Comorbidities in Medical History: 0  Navigation Acuity: 0     Follow Up  No follow-ups on file.

## 2023-11-29 NOTE — PROGRESS NOTES
"Note to patients: In accordance with the  Century Cures Act, patients are now granted immediate electronic access to their medical records. This note is primarily intended for communication among medical professionals. As a result, it may incorporate medical terminology, abbreviations, or language that could appear blunt or unfamiliar. If you have questions about this document, we encourage you to discuss it with your physician.      [facility]  Head & Neck Surgical Oncology Clinic    Patient: Saqib Rodriguez    : 1975    MRN: [id number]  Primary Care Provider: Haroon Jaime MD  Referring Provider: Rupesh Schneider MD   Date of Encounter: 2023    DIAGNOSIS: thyroid nodules    CC:   Chief Complaint   Patient presents with    Other     Left Thyroid Nodule/Jennie/EPIC and Care Everywhere       HPI:   Saqib Rodriguez is a 48 y.o. male who is being seen today for evaluation of thyroid nodules. He first noticed a lump in the middle of his neck that was visible on swallowing. His doctor ordered a thyroid conducted 2023 which showed, per report, "diffuse enlargement of the thyroid gland and a complex cystic solid solitary mass seen in the midportion of the left lobe of the gland. It was hypervascular and measured up to 3.8 cm in the longest dimension. It was considered a TIRADS-4 lesion." The patient then underwent two separate biopsies which were consistent with Shelley III. The biopsy was sent for molecular testing via Afirma which found a genetic abnormality with a 50% chance of malignancy. He has positive thyroglobulin antibodies but his TSH is currently normal.    Patient extremely concerned about why he may have formed this thyroid nodule. He is concerned his mold exposure may have adversely affected his health. He had complications including a rash after a root canal and is concerned that this may be related. He is currently seeing someone for holistic medical care " "and is on multiple supplements to try to "balance his hormones."     Patient would like to be fully informed before making a decision regarding surgery versus observation.    TREATMENT HISTORY:  None    SUBSTANCE USE:  Smoking: Never  Alcohol: No  Denies hookah, chewing tobacco, marijuana, betel nut, illicit drug, heavy cigar, vape use.    ALLERGIES:  Review of patient's allergies indicates:   Allergen Reactions    Allergen ext-venom-honey bee Swelling    Bee venom protein (honey bee) Swelling         MEDICATIONS:    Current Outpatient Medications:     ascorbic acid, vitamin C, (VITAMIN C) 1000 MG tablet, Take 1 tablet by mouth every morning., Disp: , Rfl:     aspirin (ECOTRIN) 81 MG EC tablet, Take 1 tablet (81 mg total) by mouth 2 (two) times daily., Disp: , Rfl: 0    cholecalciferol, vitamin D3, (VITAMIN D3) 25 mcg (1,000 unit) capsule, Take 10,000 Units by mouth once daily., Disp: , Rfl:     colesevelam (WELCHOL) 625 mg tablet, Take by mouth., Disp: , Rfl:     fluocinonide (LIDEX) 0.05 % external solution, , Disp: , Rfl:     ketoconazole (NIZORAL) 2 % shampoo, , Disp: , Rfl:     multivitamin capsule, Take 1 capsule by mouth once daily., Disp: , Rfl:     omega-3 fatty acids/fish oil (FISH OIL-OMEGA-3 FATTY ACIDS) 300-1,000 mg capsule, Take by mouth once daily., Disp: , Rfl:     phytonadione, vit K1, (PHYTONADIONE, VITAMIN K1,) 100 mcg Tab, Take by mouth once daily., Disp: , Rfl:     triamcinolone acetonide 0.1% (KENALOG) 0.1 % cream, MIX WITH LUBRIDERM 360 CC SHAKE WELL AND APPLY TWICE DAILY, Disp: , Rfl:     ibuprofen (ADVIL,MOTRIN) 800 MG tablet, Take 1 tablet (800 mg total) by mouth every 6 (six) hours as needed for Pain. (Patient not taking: Reported on 11/30/2023), Disp: 30 tablet, Rfl: 0  No current facility-administered medications for this visit.    Facility-Administered Medications Ordered in Other Visits:     diphenhydrAMINE injection 25 mg, 25 mg, Intravenous, Q6H PRN, Edmundo Puckett MD    " "HYDROmorphone (PF) injection 0.2 mg, 0.2 mg, Intravenous, Q5 Min PRN, Edmundo Puckett MD    lactated ringers infusion, , Intravenous, Continuous, Edmundo Puckett MD, Last Rate: 10 mL/hr at 11/10/23 0700, New Bag at 11/10/23 0700    LIDOcaine (PF) 10 mg/ml (1%) injection 10 mg, 1 mL, Intradermal, Once, Edmundo Puckett MD    oxyCODONE immediate release tablet 5 mg, 5 mg, Oral, Q3H PRN, Edmundo Puckett MD    prochlorperazine injection Soln 5 mg, 5 mg, Intravenous, Q4H PRN, Edmundo Puckett MD    sodium chloride 0.9% flush 10 mL, 10 mL, Intravenous, Once, Edmundo Puckett MD    PAST MEDICAL HISTORY:  Past Medical History:   Diagnosis Date    Exercise-induced asthma 01/16/2012    GERD (gastroesophageal reflux disease) 01/16/2012    Seasonal allergies 01/16/2012    Thyroid disease 11/07/2023    "mass in thyroid"; atypical, under observation        PAST SURGICAL HISTORY:  Past Surgical History:   Procedure Laterality Date    BIOPSY OF THYROID      x2    KNEE ARTHROSCOPY W/ MENISCECTOMY Left 11/10/2023    Procedure: ARTHROSCOPY, KNEE, WITH LATERAL MENISCECTOMY;  Surgeon: Ger Dukes MD;  Location: [facility];  Service: Orthopedics;  Laterality: Left;  possible menisectomy or meniscus repair    Knee surgery x2 Left         FAMILY HISTORY:  Family History   Problem Relation Age of Onset    Cancer Father     Asthma Sister        SOCIAL HISTORY:  Social History     Socioeconomic History    Marital status:    Tobacco Use    Smoking status: Former    Smokeless tobacco: Never    Tobacco comments:     The patient smoked occasionally. Has not smoked in over a year.   Substance and Sexual Activity    Alcohol use: Not Currently    Drug use: No    Sexual activity: Yes     Partners: Female     Social Determinants of Health     Financial Resource Strain: Low Risk  (11/30/2023)    Overall Financial Resource Strain (CARDIA)     Difficulty of Paying Living Expenses: Not hard at all   Food Insecurity: No " "Food Insecurity (11/30/2023)    Hunger Vital Sign     Worried About Running Out of Food in the Last Year: Never true     Ran Out of Food in the Last Year: Never true   Transportation Needs: No Transportation Needs (11/30/2023)    PRAPARE - Transportation     Lack of Transportation (Medical): No     Lack of Transportation (Non-Medical): No   Physical Activity: Sufficiently Active (11/30/2023)    Exercise Vital Sign     Days of Exercise per Week: 4 days     Minutes of Exercise per Session: 40 min   Stress: No Stress Concern Present (11/30/2023)    [employer] - Occupational Stress Questionnaire     Feeling of Stress : Only a little   Social Connections: Unknown (11/30/2023)    Social Connection and Isolation Panel [NHANES]     Frequency of Communication with Friends and Family: More than three times a week     Frequency of Social Gatherings with Friends and Family: More than three times a week     Active Member of Clubs or Organizations: Yes     Attends Club or Organization Meetings: More than 4 times per year     Marital Status:    Housing Stability: Unknown (11/30/2023)    Housing Stability Vital Sign     Unable to Pay for Housing in the Last Year: No     Unstable Housing in the Last Year: No     See above substance history    REVIEW OF SYSTEMS:   Comprehensive review of systems was discussed with the patient.  It is positive only for the above complaints.    PHYSICAL EXAMINATION:  Blood pressure (!) 142/97, pulse 79, temperature 97.3 °F (36.3 °C), temperature source Temporal, resp. rate 18, height 6' 2" (1.88 m), weight 84.2 kg (185 lb 10 oz), SpO2 97 %.    Constitutional: Non-toxic appearing.   Psychiatric: Appropriate mood and affect. Cooperative.  Voice: Non-dysphonic, speaking in full sentences.   Neurologic: Cranial nerves grossly intact, no focal deficits.  Head and face: Salivary glands are not enlarged. Face is symmetric. CN VII strength and sensation intact.  Skin: No " "concerning skin lesions.   Eyes: Vision grossly intact, bilateral extraocular movements intact  Ears: Bilateral pinna, mastoid, external ear canal normal. Hearing intact.   Nose: External nose appears normal.   Lips: No ulcers or lesions  Oral cavity: Mucosa is pink and moist. Buccal mucosa, gingiva, anterior tongue, floor of mouth, and hard palate appear normal. No leukoplakia, erythroplakia, ulceration, masses or lesions.  Oropharynx: Mucosa is pink and moist. Soft palate and base of tongue are normal. Posterior pharyngeal wall normal. Tonsils are normal. No lesions.  Neck: Soft and flat,  no masses or lymphadenopathy. Firm thyroid, palpable bilaterally.  Respiratory: Chest expansion symmetric, no audible stridor or stertor. Breathing is unlabored. No active cough.    DATA REVIEWED:     LABORATORY:      Latest Ref Rng & Units 9/8/2023     8:32 AM 9/22/2023    11:23 AM 11/2/2023    12:07 PM   Thyroid Labs   TSH 0.450 - 4.500 uIU/mL 1.210         WBC 3.90 - 12.70 K/uL 3.5     3.70  3.98    RBC 4.60 - 6.20 M/uL  4.87  4.81    Hemoglobin 14.0 - 18.0 g/dL 15.4     14.9  15.0    Hematocrit 40.0 - 54.0 % 45.9     44.5  44.3    MCV 82 - 98 fL  91  92    MCH 27.0 - 31.0 pg 31.0     30.6  31.2    MCHC 32.0 - 36.0 g/dL  33.5  33.9    RDW 11.5 - 14.5 % 12.4     11.9  11.7    Platelets 150 - 450 K/uL 271     280  261    MPV 9.2 - 12.9 fL  10.6  10.4    Eos # 0.0 - 0.4 x10E3/uL 0.2         Baso # 0.0 - 0.2 x10E3/uL 0.0         Eos % Not Estab. % 6         INR   0.9  0.9    APTT 24.6 - 36.7 sec  29.7  30.5        This result is from an external source.        PATHOLOGY:  FNA 11/2/23  1. LEFT THYROID FLUID, FINE NEEDLE ASPIRATION   - ATYPIA OF UNDETERMINED SIGNIFICANCE (BETHESDA CATEGORY III)     2. LEFT THYROID NODULE, FINE NEEDLE ASPIRATION   - ATYPIA OF UNDETERMINED SIGNIFICANCE (BETHESDA CATEGORY III)     AFRIMA  "50% risk of malignancy by genetic analysis" per Dr. Sun    IMAGING:  Thyroid ultrasound (images available, no " "direct read)  Per report, "diffuse enlargement of the thyroid gland and a complex cystic solid solitary mass seen in the midportion of the left lobe of the gland. It was hypervascular and measured up to 3.8 cm in the longest dimension. It was considered a TIRADS-4 lesion."    ASSESSMENT AND PLAN:  1. Left thyroid nodule         Saqib Rodriguez is a 48 y.o. male with left thyroid nodule, TIRADS-4 and Sparta III with 50% chance of malignancy on Afirma testing.  -We discussed how common thyroid nodules are in the general population. However, I explained that the vast majority of thyroid nodules are not malignant and that there are certain steps that we have to take to evaluate thyroid nodules for malignancy. He has had an ultrasound as well as an ultrasound-guided FNA. Because he is euthyroid, there is no need for a radioactive thyroid scan.  -I then discussed with the patient the potential outcomes of the fine-needle aspiration: malignant, suspicious for malignancy, follicular lesion/suspicious for follicular lesion, atypia of uncertain significance/FLUS, or benign (in addition to nondiagnostic).  Recommendations for treatment are predicated on this result.  In general benign lesions can be followed with intermittent ultrasound.  My recommendation for malignant or suspicious lesions is total thyroidectomy, potentially with radioactive iodine remnant ablation in the setting of malignancy.    -Follicular lesions merit thyroid lobectomy or total thyroidectomy, depending on the specific clinical picture or further molecular studies. In lower risk cases or in patients who are disinclined to pursue surgery or are poor surgical candidates, interval ultrasound is also reasonable. Similarly, either repeat ultrasound-guided needle biopsy or surgery is a reasonable course of action for the indeterminate biopsy result, again depending on the specific clinical picture.    -This patient falls into the last category of follicular " "lesion. Given a 50% chance of malignancy, I would recommend a hemilobectomy for definitive diagnosis, with a possibility of staged completion thyroidectomy if a follicular thyroid cancer or a large papillary cancer was confirmed. A central compartment lymph node dissection may also be performed if clinically indicated. I do not routinely perform total thyroidectomy if there is uncertainty about the cancer diagnosis.  - I explained that there is not any more definitive testing that can be performed, although an interval ultrasound and repeat biopsy may yield a different results if performed later.  - I recommended a second opinion (or third, as the patient has already seen Dr. Sun) if desired by the patient. I have recommended both my colleagues at [facility] as well as other providers both in the [facility] area and around the country  - We discussed non-FDA approved/off-label treatments such as transoral thyroidectomy, robotic thyroidectomy, ethanol ablation, and radiofrequency ablation. I do not think these are a good choice in this patient. The former two have a higher risk of complications/difficulty level given his a history of Hashimoto's disease, and there is a lack of research about cancer outcomes in the nonsurgical options.  -The procedure (hemithyroidectomy) takes anywhere from 1-4 hours and is done as an "outpatient." Sometimes I place a drain. Most patients are able to go home the same day of surgery. After surgery, all that we ask is that you avoid straining, including bending over to pick something up, for 10 days. I use sutures and bandages that are removed in clinic a week after surgery.  -The risks of thyroidectomy include, but are not limited to, infection, bleeding, scarring, failure to achieve the diagnosis, no evidence of cancer, recurrence, collection of blood or tissue fluid requiring drainage, injury to the recurrent laryngeal nerve with resultant temporary or permanent hoarseness (1% permanent " risk with up to 10% temporary risk, greater in revision operations), injury to the superior laryngeal nerve with resultant loss of the upper register for singing or challenges with yelling, temporary or permanent hypocalcemia related to injury or devascularization of the parathyroid glands (less than 5% permanent, up to 30-60% when paratracheal dissection is accomplished, again greater in revision operations), and the need for additional procedures or therapies.       Time was allowed for questions, and all questions were answered to the patient's apparent satisfaction.       Patient encouraged to call with any questions, concerns, or new or worsening symptoms.     Follow up if surgery desired, otherwise interval ultrasound and visit in 3 months to rediscuss

## 2023-11-30 ENCOUNTER — OFFICE VISIT (OUTPATIENT)
Dept: HEMATOLOGY/ONCOLOGY | Facility: CLINIC | Age: 48
End: 2023-11-30
Payer: COMMERCIAL

## 2023-11-30 VITALS
SYSTOLIC BLOOD PRESSURE: 142 MMHG | WEIGHT: 185.63 LBS | DIASTOLIC BLOOD PRESSURE: 97 MMHG | RESPIRATION RATE: 18 BRPM | OXYGEN SATURATION: 97 % | HEART RATE: 79 BPM | BODY MASS INDEX: 23.82 KG/M2 | HEIGHT: 74 IN | TEMPERATURE: 97 F

## 2023-11-30 DIAGNOSIS — E04.1 LEFT THYROID NODULE: Primary | ICD-10-CM

## 2023-11-30 DIAGNOSIS — E04.1 LEFT THYROID NODULE: ICD-10-CM

## 2023-11-30 PROCEDURE — 99999 PR PBB SHADOW E&M-EST. PATIENT-LVL V: ICD-10-PCS | Mod: PBBFAC,,, | Performed by: STUDENT IN AN ORGANIZED HEALTH CARE EDUCATION/TRAINING PROGRAM

## 2023-11-30 PROCEDURE — 3077F PR MOST RECENT SYSTOLIC BLOOD PRESSURE >= 140 MM HG: ICD-10-PCS | Mod: CPTII,S$GLB,, | Performed by: STUDENT IN AN ORGANIZED HEALTH CARE EDUCATION/TRAINING PROGRAM

## 2023-11-30 PROCEDURE — 3077F SYST BP >= 140 MM HG: CPT | Mod: CPTII,S$GLB,, | Performed by: STUDENT IN AN ORGANIZED HEALTH CARE EDUCATION/TRAINING PROGRAM

## 2023-11-30 PROCEDURE — 99205 PR OFFICE/OUTPT VISIT, NEW, LEVL V, 60-74 MIN: ICD-10-PCS | Mod: S$GLB,,, | Performed by: STUDENT IN AN ORGANIZED HEALTH CARE EDUCATION/TRAINING PROGRAM

## 2023-11-30 PROCEDURE — 99205 OFFICE O/P NEW HI 60 MIN: CPT | Mod: S$GLB,,, | Performed by: STUDENT IN AN ORGANIZED HEALTH CARE EDUCATION/TRAINING PROGRAM

## 2023-11-30 PROCEDURE — 1159F MED LIST DOCD IN RCRD: CPT | Mod: CPTII,S$GLB,, | Performed by: STUDENT IN AN ORGANIZED HEALTH CARE EDUCATION/TRAINING PROGRAM

## 2023-11-30 PROCEDURE — 3044F PR MOST RECENT HEMOGLOBIN A1C LEVEL <7.0%: ICD-10-PCS | Mod: CPTII,S$GLB,, | Performed by: STUDENT IN AN ORGANIZED HEALTH CARE EDUCATION/TRAINING PROGRAM

## 2023-11-30 PROCEDURE — 3008F PR BODY MASS INDEX (BMI) DOCUMENTED: ICD-10-PCS | Mod: CPTII,S$GLB,, | Performed by: STUDENT IN AN ORGANIZED HEALTH CARE EDUCATION/TRAINING PROGRAM

## 2023-11-30 PROCEDURE — 3080F PR MOST RECENT DIASTOLIC BLOOD PRESSURE >= 90 MM HG: ICD-10-PCS | Mod: CPTII,S$GLB,, | Performed by: STUDENT IN AN ORGANIZED HEALTH CARE EDUCATION/TRAINING PROGRAM

## 2023-11-30 PROCEDURE — 3008F BODY MASS INDEX DOCD: CPT | Mod: CPTII,S$GLB,, | Performed by: STUDENT IN AN ORGANIZED HEALTH CARE EDUCATION/TRAINING PROGRAM

## 2023-11-30 PROCEDURE — 3044F HG A1C LEVEL LT 7.0%: CPT | Mod: CPTII,S$GLB,, | Performed by: STUDENT IN AN ORGANIZED HEALTH CARE EDUCATION/TRAINING PROGRAM

## 2023-11-30 PROCEDURE — 99999 PR PBB SHADOW E&M-EST. PATIENT-LVL V: CPT | Mod: PBBFAC,,, | Performed by: STUDENT IN AN ORGANIZED HEALTH CARE EDUCATION/TRAINING PROGRAM

## 2023-11-30 PROCEDURE — 1159F PR MEDICATION LIST DOCUMENTED IN MEDICAL RECORD: ICD-10-PCS | Mod: CPTII,S$GLB,, | Performed by: STUDENT IN AN ORGANIZED HEALTH CARE EDUCATION/TRAINING PROGRAM

## 2023-11-30 PROCEDURE — 3080F DIAST BP >= 90 MM HG: CPT | Mod: CPTII,S$GLB,, | Performed by: STUDENT IN AN ORGANIZED HEALTH CARE EDUCATION/TRAINING PROGRAM

## 2023-11-30 RX ORDER — IBUPROFEN 100 MG/5ML
1 SUSPENSION, ORAL (FINAL DOSE FORM) ORAL EVERY MORNING
COMMUNITY

## 2023-11-30 RX ORDER — FLUOCINONIDE TOPICAL SOLUTION USP, 0.05% 0.5 MG/ML
SOLUTION TOPICAL
COMMUNITY
Start: 2023-09-12

## 2023-11-30 RX ORDER — COLESEVELAM 180 1/1
TABLET ORAL
COMMUNITY
Start: 2023-11-16

## 2023-11-30 RX ORDER — KETOCONAZOLE 20 MG/ML
SHAMPOO, SUSPENSION TOPICAL
COMMUNITY
Start: 2023-09-13

## 2023-11-30 RX ORDER — TRIAMCINOLONE ACETONIDE 1 MG/G
CREAM TOPICAL
COMMUNITY
Start: 2023-09-22

## 2023-12-20 ENCOUNTER — OFFICE VISIT (OUTPATIENT)
Dept: ORTHOPEDICS | Facility: CLINIC | Age: 48
End: 2023-12-20
Payer: COMMERCIAL

## 2023-12-20 VITALS — WEIGHT: 185 LBS | BODY MASS INDEX: 23.74 KG/M2 | HEIGHT: 74 IN

## 2023-12-20 DIAGNOSIS — S83.282A TEAR OF LATERAL MENISCUS OF LEFT KNEE, UNSPECIFIED TEAR TYPE, UNSPECIFIED WHETHER OLD OR CURRENT TEAR, INITIAL ENCOUNTER: Primary | ICD-10-CM

## 2023-12-20 PROCEDURE — 99024 POSTOP FOLLOW-UP VISIT: CPT | Mod: S$GLB,,, | Performed by: ORTHOPAEDIC SURGERY

## 2023-12-20 PROCEDURE — 1159F MED LIST DOCD IN RCRD: CPT | Mod: CPTII,S$GLB,, | Performed by: ORTHOPAEDIC SURGERY

## 2023-12-20 PROCEDURE — 99999 PR PBB SHADOW E&M-EST. PATIENT-LVL III: ICD-10-PCS | Mod: PBBFAC,,, | Performed by: ORTHOPAEDIC SURGERY

## 2023-12-20 PROCEDURE — 3044F PR MOST RECENT HEMOGLOBIN A1C LEVEL <7.0%: ICD-10-PCS | Mod: CPTII,S$GLB,, | Performed by: ORTHOPAEDIC SURGERY

## 2023-12-20 PROCEDURE — 3044F HG A1C LEVEL LT 7.0%: CPT | Mod: CPTII,S$GLB,, | Performed by: ORTHOPAEDIC SURGERY

## 2023-12-20 PROCEDURE — 1160F PR REVIEW ALL MEDS BY PRESCRIBER/CLIN PHARMACIST DOCUMENTED: ICD-10-PCS | Mod: CPTII,S$GLB,, | Performed by: ORTHOPAEDIC SURGERY

## 2023-12-20 PROCEDURE — 1160F RVW MEDS BY RX/DR IN RCRD: CPT | Mod: CPTII,S$GLB,, | Performed by: ORTHOPAEDIC SURGERY

## 2023-12-20 PROCEDURE — 1159F PR MEDICATION LIST DOCUMENTED IN MEDICAL RECORD: ICD-10-PCS | Mod: CPTII,S$GLB,, | Performed by: ORTHOPAEDIC SURGERY

## 2023-12-20 PROCEDURE — 99024 PR POST-OP FOLLOW-UP VISIT: ICD-10-PCS | Mod: S$GLB,,, | Performed by: ORTHOPAEDIC SURGERY

## 2023-12-20 PROCEDURE — 99999 PR PBB SHADOW E&M-EST. PATIENT-LVL III: CPT | Mod: PBBFAC,,, | Performed by: ORTHOPAEDIC SURGERY

## 2023-12-20 NOTE — PROGRESS NOTES
"  Past Medical History:   Diagnosis Date    Exercise-induced asthma 01/16/2012    GERD (gastroesophageal reflux disease) 01/16/2012    Seasonal allergies 01/16/2012    Thyroid disease 11/07/2023    "mass in thyroid"; atypical, under observation       Past Surgical History:   Procedure Laterality Date    BIOPSY OF THYROID      x2    KNEE ARTHROSCOPY W/ MENISCECTOMY Left 11/10/2023    Procedure: ARTHROSCOPY, KNEE, WITH LATERAL MENISCECTOMY;  Surgeon: Ger Dukes MD;  Location: [facility];  Service: Orthopedics;  Laterality: Left;  possible menisectomy or meniscus repair    Knee surgery x2 Left        Current Outpatient Medications   Medication Sig    ascorbic acid, vitamin C, (VITAMIN C) 1000 MG tablet Take 1 tablet by mouth every morning.    aspirin (ECOTRIN) 81 MG EC tablet Take 1 tablet (81 mg total) by mouth 2 (two) times daily.    cholecalciferol, vitamin D3, (VITAMIN D3) 25 mcg (1,000 unit) capsule Take 10,000 Units by mouth once daily.    colesevelam (WELCHOL) 625 mg tablet Take by mouth.    fluocinonide (LIDEX) 0.05 % external solution     ketoconazole (NIZORAL) 2 % shampoo     multivitamin capsule Take 1 capsule by mouth once daily.    omega-3 fatty acids/fish oil (FISH OIL-OMEGA-3 FATTY ACIDS) 300-1,000 mg capsule Take by mouth once daily.    phytonadione, vit K1, (PHYTONADIONE, VITAMIN K1,) 100 mcg Tab Take by mouth once daily.    triamcinolone acetonide 0.1% (KENALOG) 0.1 % cream MIX WITH LUBRIDERM 360 CC SHAKE WELL AND APPLY TWICE DAILY     No current facility-administered medications for this visit.     Facility-Administered Medications Ordered in Other Visits   Medication    diphenhydrAMINE injection 25 mg    HYDROmorphone (PF) injection 0.2 mg    lactated ringers infusion    LIDOcaine (PF) 10 mg/ml (1%) injection 10 mg    oxyCODONE immediate release tablet 5 mg    prochlorperazine injection Soln 5 mg    sodium chloride 0.9% flush 10 mL       Review of patient's allergies indicates:   Allergen " Reactions    Allergen ext-venom-honey bee Swelling    Bee venom protein (honey bee) Swelling       Family History   Problem Relation Age of Onset    Cancer Father     Asthma Sister        Social History     Socioeconomic History    Marital status:    Tobacco Use    Smoking status: Former    Smokeless tobacco: Never    Tobacco comments:     The patient smoked occasionally. Has not smoked in over a year.   Substance and Sexual Activity    Alcohol use: Not Currently    Drug use: No    Sexual activity: Yes     Partners: Female     Social Determinants of Health     Financial Resource Strain: Low Risk  (11/30/2023)    Overall Financial Resource Strain (CARDIA)     Difficulty of Paying Living Expenses: Not hard at all   Food Insecurity: No Food Insecurity (11/30/2023)    Hunger Vital Sign     Worried About Running Out of Food in the Last Year: Never true     Ran Out of Food in the Last Year: Never true   Transportation Needs: No Transportation Needs (11/30/2023)    PRAPARE - Transportation     Lack of Transportation (Medical): No     Lack of Transportation (Non-Medical): No   Physical Activity: Sufficiently Active (11/30/2023)    Exercise Vital Sign     Days of Exercise per Week: 4 days     Minutes of Exercise per Session: 40 min   Stress: No Stress Concern Present (11/30/2023)    [employer] [place] [employer] - Occupational Stress Questionnaire     Feeling of Stress : Only a little   Social Connections: Unknown (11/30/2023)    Social Connection and Isolation Panel [NHANES]     Frequency of Communication with Friends and Family: More than three times a week     Frequency of Social Gatherings with Friends and Family: More than three times a week     Active Member of Clubs or Organizations: Yes     Attends Club or Organization Meetings: More than 4 times per year     Marital Status:    Housing Stability: Unknown (11/30/2023)    Housing Stability Vital Sign     Unable to Pay for Housing in the Last  Year: No     Unstable Housing in the Last Year: No       Chief Complaint: No chief complaint on file.      Date of surgery:  November 10, 2000 23    History of present illness:  This is a 48-year-old male underwent left partial lateral meniscectomy.  Patient had an old ACL injury.  Patient had some femoral cartilage thinning as well.  Patient is doing Well.  He is doing physical therapy once a week.  No pain.  No real instability.  Just has a little soreness after working out.      Review of Systems:    Musculoskeletal:  See HPI        Physical Examination:    Vital Signs:  There were no vitals filed for this visit.    There is no height or weight on file to calculate BMI.    This a well-developed, well nourished patient in no acute distress.  They are alert and oriented and cooperative to examination.  Pt. walks without an antalgic gait.      Examination left knee shows healed surgical portals.  No erythema drainage.  Maybe a little small effusion.  Patient has full range of motion.  No calf pain.    X-rays:  None     Assessment::  Status post partial lateral meniscectomy    Plan:  Continue with physical therapy regimen.  Patient will not need more physical therapy once finished with this round.  Patient will follow up as needed.    This note was created using [employer] voice recognition software that occasionally misinterpreted phrases or words.

## 2024-02-27 ENCOUNTER — CLINICAL SUPPORT (OUTPATIENT)
Dept: OTHER | Facility: CLINIC | Age: 49
End: 2024-02-27
Payer: COMMERCIAL

## 2024-02-27 DIAGNOSIS — Z00.8 ENCOUNTER FOR OTHER GENERAL EXAMINATION: ICD-10-CM

## 2024-02-27 PROCEDURE — 80061 LIPID PANEL: CPT | Mod: QW,S$GLB,, | Performed by: INTERNAL MEDICINE

## 2024-02-27 PROCEDURE — 99401 PREV MED CNSL INDIV APPRX 15: CPT | Mod: S$GLB,,, | Performed by: INTERNAL MEDICINE

## 2024-02-27 PROCEDURE — 82947 ASSAY GLUCOSE BLOOD QUANT: CPT | Mod: QW,S$GLB,, | Performed by: INTERNAL MEDICINE

## 2024-02-28 VITALS
BODY MASS INDEX: 24.24 KG/M2 | WEIGHT: 179 LBS | DIASTOLIC BLOOD PRESSURE: 68 MMHG | HEIGHT: 72 IN | SYSTOLIC BLOOD PRESSURE: 118 MMHG

## 2024-02-28 LAB
GLUCOSE SERPL-MCNC: 109 MG/DL (ref 60–140)
HDLC SERPL-MCNC: 40 MG/DL
POC CHOLESTEROL, LDL (DOCK): 101 MG/DL
POC CHOLESTEROL, TOTAL: 159 MG/DL
TRIGL SERPL-MCNC: 98 MG/DL

## 2024-02-29 ENCOUNTER — OFFICE VISIT (OUTPATIENT)
Dept: HEMATOLOGY/ONCOLOGY | Facility: CLINIC | Age: 49
End: 2024-02-29
Payer: COMMERCIAL

## 2024-02-29 ENCOUNTER — HOSPITAL ENCOUNTER (OUTPATIENT)
Dept: RADIOLOGY | Facility: HOSPITAL | Age: 49
Discharge: HOME | End: 2024-02-29
Attending: STUDENT IN AN ORGANIZED HEALTH CARE EDUCATION/TRAINING PROGRAM
Payer: COMMERCIAL

## 2024-02-29 VITALS
WEIGHT: 184.5 LBS | HEART RATE: 68 BPM | SYSTOLIC BLOOD PRESSURE: 138 MMHG | TEMPERATURE: 98 F | RESPIRATION RATE: 16 BRPM | HEIGHT: 73 IN | OXYGEN SATURATION: 98 % | BODY MASS INDEX: 24.45 KG/M2 | DIASTOLIC BLOOD PRESSURE: 78 MMHG

## 2024-02-29 DIAGNOSIS — E04.1 LEFT THYROID NODULE: Primary | ICD-10-CM

## 2024-02-29 DIAGNOSIS — E04.1 LEFT THYROID NODULE: ICD-10-CM

## 2024-02-29 PROCEDURE — 3075F SYST BP GE 130 - 139MM HG: CPT | Mod: CPTII,S$GLB,, | Performed by: NURSE PRACTITIONER

## 2024-02-29 PROCEDURE — 3078F DIAST BP <80 MM HG: CPT | Mod: CPTII,S$GLB,, | Performed by: NURSE PRACTITIONER

## 2024-02-29 PROCEDURE — 1159F MED LIST DOCD IN RCRD: CPT | Mod: CPTII,S$GLB,, | Performed by: NURSE PRACTITIONER

## 2024-02-29 PROCEDURE — 76536 US EXAM OF HEAD AND NECK: CPT | Mod: 26,,, | Performed by: RADIOLOGY

## 2024-02-29 PROCEDURE — 3008F BODY MASS INDEX DOCD: CPT | Mod: CPTII,S$GLB,, | Performed by: NURSE PRACTITIONER

## 2024-02-29 PROCEDURE — 99213 OFFICE O/P EST LOW 20 MIN: CPT | Mod: S$GLB,,, | Performed by: NURSE PRACTITIONER

## 2024-02-29 PROCEDURE — 99999 PR PBB SHADOW E&M-EST. PATIENT-LVL IV: CPT | Mod: PBBFAC,,, | Performed by: NURSE PRACTITIONER

## 2024-02-29 PROCEDURE — 76536 US EXAM OF HEAD AND NECK: CPT | Mod: TC,PO

## 2024-02-29 NOTE — PROGRESS NOTES
"Note to patients: In accordance with the  Century Cures Act, patients are now granted immediate electronic access to their medical records. This note is primarily intended for communication among medical professionals. As a result, it may incorporate medical terminology, abbreviations, or language that could appear blunt or unfamiliar. If you have questions about this document, we encourage you to discuss it with your physician.      [facility]  [facility]    Patient: Saqib Rodriguez    : 1975    MRN: [id number]  Primary Care Provider: Haroon Jaime MD  Referring Provider: No ref. provider found   Date of Encounter: 2024    DIAGNOSIS: thyroid nodules    CC:   Chief Complaint   Patient presents with    Thyroid Nodule     HPI:   Saqib Rodriguez is a 48 y.o. male who is being seen today for evaluation of thyroid nodules. He first noticed a lump in the middle of his neck that was visible on swallowing. His doctor ordered a thyroid conducted 2023 which showed, per report, "diffuse enlargement of the thyroid gland and a complex cystic solid solitary mass seen in the midportion of the left lobe of the gland. It was hypervascular and measured up to 3.8 cm in the longest dimension. It was considered a TIRADS-4 lesion." The patient then underwent two separate biopsies which were consistent with [place] III. The biopsy was sent for molecular testing via [employer]ir[facility] which found a genetic abnormality with a 50% chance of malignancy. He has positive thyroglobulin antibodies but his TSH is currently normal.    Patient extremely concerned about why he may have formed this thyroid nodule. He is concerned his mold exposure may have adversely affected his health. He had complications including a rash after a root canal and is concerned that this may be related. He is currently seeing someone for holistic medical care and is on multiple supplements to try to " ""balance his hormones."     Mr. Cerrato is here today to review repeat U/S. He denies any new complaints or concerns. He is not sure if he is still reading to proceed with surgery and wants to do some more research.     TREATMENT HISTORY:  None    SUBSTANCE USE:  Smoking: Never  Alcohol: No  Denies hookah, chewing tobacco, marijuana, betel nut, illicit drug, heavy cigar, vape use.    ALLERGIES:  Review of patient's allergies indicates:   Allergen Reactions    Allergen ext-venom-honey bee Swelling    Bee venom protein (honey bee) Swelling         MEDICATIONS:    Current Outpatient Medications:     ascorbic acid, vitamin C, (VITAMIN C) 1000 MG tablet, Take 1 tablet by mouth every morning., Disp: , Rfl:     cholecalciferol, vitamin D3, (VITAMIN D3) 25 mcg (1,000 unit) capsule, Take 10,000 Units by mouth once daily., Disp: , Rfl:     colesevelam (WELCHOL) 625 mg tablet, Take by mouth., Disp: , Rfl:     fluocinonide (LIDEX) 0.05 % external solution, , Disp: , Rfl:     ketoconazole (NIZORAL) 2 % shampoo, , Disp: , Rfl:     multivitamin capsule, Take 1 capsule by mouth once daily., Disp: , Rfl:     omega-3 fatty acids/fish oil (FISH OIL-OMEGA-3 FATTY ACIDS) 300-1,000 mg capsule, Take by mouth once daily., Disp: , Rfl:     phytonadione, vit K1, (PHYTONADIONE, VITAMIN K1,) 100 mcg Tab, Take by mouth once daily., Disp: , Rfl:     triamcinolone acetonide 0.1% (KENALOG) 0.1 % cream, MIX WITH LUBRIDERM 360 CC SHAKE WELL AND APPLY TWICE DAILY, Disp: , Rfl:     aspirin (ECOTRIN) 81 MG EC tablet, Take 1 tablet (81 mg total) by mouth 2 (two) times daily. (Patient not taking: Reported on 2/29/2024), Disp: , Rfl: 0  No current facility-administered medications for this visit.    Facility-Administered Medications Ordered in Other Visits:     diphenhydrAMINE injection 25 mg, 25 mg, Intravenous, Q6H PRN, Edmundo Puckett MD    HYDROmorphone (PF) injection 0.2 mg, 0.2 mg, Intravenous, Q5 Min PRN, Edmundo Puckett, MD    lactated " "ringers infusion, , Intravenous, Continuous, Edmundo Puckett MD, Last Rate: 10 mL/hr at 11/10/23 0700, New Bag at 11/10/23 0700    LIDOcaine (PF) 10 mg/ml (1%) injection 10 mg, 1 mL, Intradermal, Once, Edmundo Puckett MD    oxyCODONE immediate release tablet 5 mg, 5 mg, Oral, Q3H PRN, Edmundo Puckett MD    prochlorperazine injection Soln 5 mg, 5 mg, Intravenous, Q4H PRN, Edmundo Puckett MD    sodium chloride 0.9% flush 10 mL, 10 mL, Intravenous, Once, Edmundo Puckett MD    PAST MEDICAL HISTORY:  Past Medical History:   Diagnosis Date    Exercise-induced asthma 01/16/2012    GERD (gastroesophageal reflux disease) 01/16/2012    Seasonal allergies 01/16/2012    Thyroid disease 11/07/2023    "mass in thyroid"; atypical, under observation        PAST SURGICAL HISTORY:  Past Surgical History:   Procedure Laterality Date    BIOPSY OF THYROID      x2    KNEE ARTHROSCOPY W/ MENISCECTOMY Left 11/10/2023    Procedure: ARTHROSCOPY, KNEE, WITH LATERAL MENISCECTOMY;  Surgeon: Ger Dukes MD;  Location: [facility];  Service: Orthopedics;  Laterality: Left;  possible menisectomy or meniscus repair    Knee surgery x2 Left         FAMILY HISTORY:  Family History   Problem Relation Age of Onset    Cancer Father     Asthma Sister        SOCIAL HISTORY:  Social History     Socioeconomic History    Marital status:    Tobacco Use    Smoking status: Former    Smokeless tobacco: Never    Tobacco comments:     The patient smoked occasionally. Has not smoked in over a year.   Substance and Sexual Activity    Alcohol use: Not Currently    Drug use: No    Sexual activity: Yes     Partners: Female     Social Determinants of Health     Financial Resource Strain: Low Risk  (11/30/2023)    Overall Financial Resource Strain (CARDIA)     Difficulty of Paying Living Expenses: Not hard at all   Food Insecurity: No Food Insecurity (11/30/2023)    Hunger Vital Sign     Worried About Running Out of Food in the Last " "Year: Never true     Ran Out of Food in the Last Year: Never true   Transportation Needs: No Transportation Needs (11/30/2023)    PRAPARE - Transportation     Lack of Transportation (Medical): No     Lack of Transportation (Non-Medical): No   Physical Activity: Sufficiently Active (11/30/2023)    Exercise Vital Sign     Days of Exercise per Week: 4 days     Minutes of Exercise per Session: 40 min   Stress: No Stress Concern Present (11/30/2023)    [employer] [place] [employer] - Occupational Stress Questionnaire     Feeling of Stress : Only a little   Social Connections: Unknown (11/30/2023)    Social Connection and Isolation Panel [NHANES]     Frequency of Communication with Friends and Family: More than three times a week     Frequency of Social Gatherings with Friends and Family: More than three times a week     Active Member of Clubs or Organizations: Yes     Attends Club or Organization Meetings: More than 4 times per year     Marital Status:    Housing Stability: Unknown (11/30/2023)    Housing Stability Vital Sign     Unable to Pay for Housing in the Last Year: No     Unstable Housing in the Last Year: No     See above substance history    REVIEW OF SYSTEMS:   Comprehensive review of systems was discussed with the patient.  It is positive only for the above complaints.    PHYSICAL EXAMINATION:  Blood pressure 138/78, pulse 68, temperature 98.3 °F (36.8 °C), temperature source Temporal, resp. rate 16, height 6' 1" (1.854 m), weight 83.7 kg (184 lb 8.4 oz), SpO2 98 %.    Constitutional: Non-toxic appearing.   Psychiatric: Appropriate mood and affect. Cooperative.  Voice: Non-dysphonic, speaking in full sentences.   Neurologic: Cranial nerves grossly intact, no focal deficits.  Head and face: Salivary glands are not enlarged. Face is symmetric. CN VII strength and sensation intact.  Skin: No concerning skin lesions.   Eyes: Vision grossly intact, bilateral extraocular movements intact  Ears: " "Bilateral pinna, mastoid, external ear canal normal. Hearing intact.   Nose: External nose appears normal.   Lips: No ulcers or lesions  Oral cavity: Mucosa is pink and moist. Buccal mucosa, gingiva, anterior tongue, floor of mouth, and hard palate appear normal. No leukoplakia, erythroplakia, ulceration, masses or lesions.  Oropharynx: Mucosa is pink and moist. Soft palate and base of tongue are normal. Posterior pharyngeal wall normal. Tonsils are normal. No lesions.  Neck: Soft and flat,  no masses or lymphadenopathy. Firm thyroid, palpable bilaterally.  Respiratory: Chest expansion symmetric, no audible stridor or stertor. Breathing is unlabored. No active cough.    DATA REVIEWED:     LABORATORY:      Latest Ref Rng & Units 2023     8:32 AM 2023    11:23 AM 2023    12:07 PM   Thyroid Labs   TSH 0.450 - 4.500 uIU/mL 1.210         WBC 3.90 - 12.70 K/uL 3.5     3.70  3.98    RBC 4.60 - 6.20 M/uL  4.87  4.81    Hemoglobin 14.0 - 18.0 g/dL 15.4     14.9  15.0    Hematocrit 40.0 - 54.0 % 45.9     44.5  44.3    MCV 82 - 98 fL  91  92    MCH 27.0 - 31.0 pg 31.0     30.6  31.2    MCHC 32.0 - 36.0 g/dL  33.5  33.9    RDW 11.5 - 14.5 % 12.4     11.9  11.7    Platelets 150 - 450 K/uL 271     280  261    MPV 9.2 - 12.9 fL  10.6  10.4    Eos # 0.0 - 0.4 x10E3/uL 0.2         Baso # 0.0 - 0.2 x10E3/uL 0.0         Eos % Not Estab. % 6         INR   0.9  0.9    APTT 24.6 - 36.7 sec  29.7  30.5        This result is from an external source.        PATHOLOGY:  FNA 23  1. LEFT THYROID FLUID, FINE NEEDLE ASPIRATION   - ATYPIA OF UNDETERMINED SIGNIFICANCE (BETHESDA CATEGORY III)     2. LEFT THYROID NODULE, FINE NEEDLE ASPIRATION   - ATYPIA OF UNDETERMINED SIGNIFICANCE (BETHESDA CATEGORY III)     AFRIMA  "50% risk of malignancy by genetic analysis" per Dr. Sun    IMAGIN/29/24: US THYROID   Impression:   Multinodular goiter with unchanged 35 mm TI-RADS 5 nodule occupying the left thyroid midpole.  This nodule " "was sampled percutaneously in the interim since the prior study.  No interval detrimental change when compared to the prior study.     Thyroid ultrasound (images available, no direct read)  Per report, "diffuse enlargement of the thyroid gland and a complex cystic solid solitary mass seen in the midportion of the left lobe of the gland. It was hypervascular and measured up to 3.8 cm in the longest dimension. It was considered a TIRADS-4 lesion."    ASSESSMENT AND PLAN:  1. Left thyroid nodule         Saqib Rodriguez is a 48 y.o. male with left thyroid nodule, TIRADS-4 and [place] III with 50% chance of malignancy on Afirma testing.  -This patient falls into the last category of follicular lesion. Given a 50% chance of malignancy, I would recommend a hemilobectomy for definitive diagnosis, with a possibility of staged completion thyroidectomy if a follicular thyroid cancer or a large papillary cancer was confirmed. A central compartment lymph node dissection may also be performed if clinically indicated. I do not routinely perform total thyroidectomy if there is uncertainty about the cancer diagnosis.    -The procedure (hemithyroidectomy) takes anywhere from 1-4 hours and is done as an "outpatient." Sometimes I place a drain. Most patients are able to go home the same day of surgery. After surgery, all that we ask is that you avoid straining, including bending over to pick something up, for 10 days. Dr. Marie uses sutures and bandages that are removed in clinic a week after surgery.  -The risks of thyroidectomy include, but are not limited to, infection, bleeding, scarring, failure to achieve the diagnosis, no evidence of cancer, recurrence, collection of blood or tissue fluid requiring drainage, injury to the recurrent laryngeal nerve with resultant temporary or permanent hoarseness (1% permanent risk with up to 10% temporary risk, greater in revision operations), injury to the superior laryngeal nerve with " resultant loss of the upper register for singing or challenges with yelling, temporary or permanent hypocalcemia related to injury or devascularization of the parathyroid glands (less than 5% permanent, up to 30-60% when paratracheal dissection is accomplished, again greater in revision operations), and the need for additional procedures or therapies.       Time was allowed for questions, and all questions were answered to the patient's apparent satisfaction.       Patient encouraged to call with any questions, concerns, or new or worsening symptoms.     Follow up if surgery desired, otherwise interval ultrasound and visit in 6 months to rediscuss

## 2024-08-21 NOTE — PROGRESS NOTES
"Note to patients: In accordance with the  Century Cures Act, patients are now granted immediate electronic access to their medical records. This note is primarily intended for communication among medical professionals. As a result, it may incorporate medical terminology, abbreviations, or language that could appear blunt or unfamiliar. If you have questions about this document, we encourage you to discuss it with your physician.      [facility]  [facility]    Patient: Saqib Rodriguez    : 1975    MRN: [id number]  Primary Care Provider: Haroon Jaime MD  Referring Provider: No ref. provider found   Date of Encounter: 2024    DIAGNOSIS: thyroid nodules    CC:   No chief complaint on file.    HPI:   Saqib Rodriguez is a 48 y.o. male who is being seen today for evaluation of thyroid nodules. He first noticed a lump in the middle of his neck that was visible on swallowing. His doctor ordered a thyroid conducted 2023 which showed, per report, "diffuse enlargement of the thyroid gland and a complex cystic solid solitary mass seen in the midportion of the left lobe of the gland. It was hypervascular and measured up to 3.8 cm in the longest dimension. It was considered a TIRADS-4 lesion." The patient then underwent two separate biopsies which were consistent with Pasco III. The biopsy was sent for molecular testing via [employer]ir[facility] which found a genetic abnormality with a 50% chance of malignancy. He has positive thyroglobulin antibodies but his TSH is currently normal.    Patient extremely concerned about why he may have formed this thyroid nodule. He is concerned his mold exposure may have adversely affected his health. He had complications including a rash after a root canal and is concerned that this may be related. He is currently seeing someone for holistic medical care and is on multiple supplements to try to "balance his hormones."     Mr." Saqib is here today to review repeat U/S. He denies any new complaints or concerns. He is not sure if he is still ready to proceed with surgery and wants to do some more research.     8/22/24: He is here today for his repeat US review. He denies any changes since his last appointment. He is still not wanting to proceed with surgery at this time and would like to continue to wait. He verbalizes that he understands the risk of not having his thyroid removed and the 50% chance of it being cancer.       TREATMENT HISTORY:  None    SUBSTANCE USE:  Smoking: Never  Alcohol: No  Denies hookah, chewing tobacco, marijuana, betel nut, illicit drug, heavy cigar, vape use.    ALLERGIES:  Review of patient's allergies indicates:   Allergen Reactions    Allergen ext-venom-honey bee Swelling    Bee venom protein (honey bee) Swelling         MEDICATIONS:    Current Outpatient Medications:     ascorbic acid, vitamin C, (VITAMIN C) 1000 MG tablet, Take 1 tablet by mouth every morning., Disp: , Rfl:     aspirin (ECOTRIN) 81 MG EC tablet, Take 1 tablet (81 mg total) by mouth 2 (two) times daily. (Patient not taking: Reported on 2/29/2024), Disp: , Rfl: 0    cholecalciferol, vitamin D3, (VITAMIN D3) 25 mcg (1,000 unit) capsule, Take 10,000 Units by mouth once daily., Disp: , Rfl:     colesevelam (WELCHOL) 625 mg tablet, Take by mouth., Disp: , Rfl:     fluocinonide (LIDEX) 0.05 % external solution, , Disp: , Rfl:     ketoconazole (NIZORAL) 2 % shampoo, , Disp: , Rfl:     multivitamin capsule, Take 1 capsule by mouth once daily., Disp: , Rfl:     omega-3 fatty acids/fish oil (FISH OIL-OMEGA-3 FATTY ACIDS) 300-1,000 mg capsule, Take by mouth once daily., Disp: , Rfl:     phytonadione, vit K1, (PHYTONADIONE, VITAMIN K1,) 100 mcg Tab, Take by mouth once daily., Disp: , Rfl:     triamcinolone acetonide 0.1% (KENALOG) 0.1 % cream, MIX WITH LUBRIDERM 360 CC SHAKE WELL AND APPLY TWICE DAILY, Disp: , Rfl:   No current facility-administered  "medications for this visit.    Facility-Administered Medications Ordered in Other Visits:     diphenhydrAMINE injection 25 mg, 25 mg, Intravenous, Q6H PRN, Edmundo Puckett MD    HYDROmorphone (PF) injection 0.2 mg, 0.2 mg, Intravenous, Q5 Min PRN, Edmundo Puckett MD    lactated ringers infusion, , Intravenous, Continuous, Edmundo Puckett MD, Last Rate: 10 mL/hr at 11/10/23 0700, New Bag at 11/10/23 0700    LIDOcaine (PF) 10 mg/ml (1%) injection 10 mg, 1 mL, Intradermal, Once, Edmundo Puckett MD    oxyCODONE immediate release tablet 5 mg, 5 mg, Oral, Q3H PRN, Edmundo Puckett MD    prochlorperazine injection Soln 5 mg, 5 mg, Intravenous, Q4H PRN, Edmundo Puckett MD    sodium chloride 0.9% flush 10 mL, 10 mL, Intravenous, Once, Edmundo Puckett MD    PAST MEDICAL HISTORY:  Past Medical History:   Diagnosis Date    Exercise-induced asthma 01/16/2012    GERD (gastroesophageal reflux disease) 01/16/2012    Seasonal allergies 01/16/2012    Thyroid disease 11/07/2023    "mass in thyroid"; atypical, under observation        PAST SURGICAL HISTORY:  Past Surgical History:   Procedure Laterality Date    BIOPSY OF THYROID      x2    KNEE ARTHROSCOPY W/ MENISCECTOMY Left 11/10/2023    Procedure: ARTHROSCOPY, KNEE, WITH LATERAL MENISCECTOMY;  Surgeon: Ger Dukes MD;  [facility];  Service: Orthopedics;  Laterality: Left;  possible menisectomy or meniscus repair    Knee surgery x2 Left         FAMILY HISTORY:  Family History   Problem Relation Name Age of Onset    Cancer Father      Asthma Sister         SOCIAL HISTORY:  Social History     Socioeconomic History    Marital status:    Tobacco Use    Smoking status: Former    Smokeless tobacco: Never    Tobacco comments:     The patient smoked occasionally. Has not smoked in over a year.   Substance and Sexual Activity    Alcohol use: Not Currently    Drug use: No    Sexual activity: Yes     Partners: Female     Social Determinants " of Health     Financial Resource Strain: Low Risk  (11/30/2023)    Overall Financial Resource Strain (CARDIA)     Difficulty of Paying Living Expenses: Not hard at all   Food Insecurity: No Food Insecurity (11/30/2023)    Hunger Vital Sign     Worried About Running Out of Food in the Last Year: Never true     Ran Out of Food in the Last Year: Never true   Transportation Needs: No Transportation Needs (11/30/2023)    PRAPARE - Transportation     Lack of Transportation (Medical): No     Lack of Transportation (Non-Medical): No   Physical Activity: Sufficiently Active (11/30/2023)    Exercise Vital Sign     Days of Exercise per Week: 4 days     Minutes of Exercise per Session: 40 min   Stress: No Stress Concern Present (11/30/2023)    [employer] - Occupational Stress Questionnaire     Feeling of Stress : Only a little   Housing Stability: Unknown (11/30/2023)    Housing Stability Vital Sign     Unable to Pay for Housing in the Last Year: No     Unstable Housing in the Last Year: No     See above substance history    REVIEW OF SYSTEMS:   Comprehensive review of systems was discussed with the patient.  It is positive only for the above complaints.    PHYSICAL EXAMINATION:  There were no vitals taken for this visit.    Constitutional: Non-toxic appearing.   Psychiatric: Appropriate mood and affect. Cooperative.  Voice: Non-dysphonic, speaking in full sentences.   Neurologic: Cranial nerves grossly intact, no focal deficits.  Head and face: Salivary glands are not enlarged. Face is symmetric. CN VII strength and sensation intact.  Skin: No concerning skin lesions.   Eyes: Vision grossly intact, bilateral extraocular movements intact  Ears: Bilateral pinna, mastoid, external ear canal normal. Hearing intact.   Nose: External nose appears normal.   Lips: No ulcers or lesions  Oral cavity: Mucosa is pink and moist. Buccal mucosa, gingiva, anterior tongue, floor of mouth, and hard palate appear  "normal. No leukoplakia, erythroplakia, ulceration, masses or lesions.  Oropharynx: Mucosa is pink and moist. Soft palate and base of tongue are normal. Posterior pharyngeal wall normal. Tonsils are normal. No lesions.  Neck: Soft and flat,  no masses or lymphadenopathy. Firm thyroid, palpable bilaterally.  Respiratory: Chest expansion symmetric, no audible stridor or stertor. Breathing is unlabored. No active cough.    DATA REVIEWED:     LABORATORY:      Latest Ref Rng & Units 9/8/2023     8:32 AM 9/22/2023    11:23 AM 11/2/2023    12:07 PM   Thyroid Labs   TSH 0.450 - 4.500 uIU/mL 1.210         WBC 3.90 - 12.70 K/uL 3.5     3.70  3.98    RBC 4.60 - 6.20 M/uL  4.87  4.81    Hemoglobin 14.0 - 18.0 g/dL 15.4     14.9  15.0    Hematocrit 40.0 - 54.0 % 45.9     44.5  44.3    MCV 82 - 98 fL  91  92    MCH 27.0 - 31.0 pg 31.0     30.6  31.2    MCHC 32.0 - 36.0 g/dL  33.5  33.9    RDW 11.5 - 14.5 % 12.4     11.9  11.7    Platelets 150 - 450 K/uL 271     280  261    MPV 9.2 - 12.9 fL  10.6  10.4    Eos # 0.0 - 0.4 x10E3/uL 0.2         Baso # 0.0 - 0.2 x10E3/uL 0.0         Eos % Not Estab. % 6         INR   0.9  0.9    APTT 24.6 - 36.7 sec  29.7  30.5        This result is from an external source.        PATHOLOGY:  [facility] 11/2/23  1. LEFT THYROID FLUID, FINE NEEDLE ASPIRATION   - ATYPIA OF UNDETERMINED SIGNIFICANCE (BETHESDA CATEGORY III)     2. LEFT THYROID NODULE, FINE NEEDLE ASPIRATION   - ATYPIA OF UNDETERMINED SIGNIFICANCE (BETHESDA CATEGORY III)     AFRIMA  "50% risk of malignancy by genetic analysis" per Dr. Sun    IMAGING:  US THYROID 8/22/24  FINDINGS:  The isthmus measures 0.3 cm.  The right thyroid lobe measures 2.4 x 5.8 x 1.5 cm.  The left thyroid lobe measures 2.8 x 5.9 x 3.3 cm.  Cystic solid left thyroid lobe nodule with macro calcifications measures 3.7 x 2.7 x 2.8 cm (TI-RADS 4), no significant change.  Impression:  Stable left thyroid lobe TI-RADS 4 nodule measuring 3.7 cm which has been previously " "sampled.       2/29/24: US THYROID   Impression:   Multinodular goiter with unchanged 35 mm TI-RADS 5 nodule occupying the left thyroid midpole.  This nodule was sampled percutaneously in the interim since the prior study.  No interval detrimental change when compared to the prior study.     Thyroid ultrasound (images available, no direct read)  Per report, "diffuse enlargement of the thyroid gland and a complex cystic solid solitary mass seen in the midportion of the left lobe of the gland. It was hypervascular and measured up to 3.8 cm in the longest dimension. It was considered a TIRADS-4 lesion."      Ultrasound imaging reviewed today by me and thyroid nodule has slightly grown in size. Recommend hemithyroidectomy at this time.     ASSESSMENT AND PLAN:  No diagnosis found.       Saqib Rodriguez is a 48 y.o. male with left thyroid nodule, TIRADS-4 and Hurley III with 50% chance of malignancy on Afirma testing.  -This patient falls into the last category of follicular lesion. Given a 50% chance of malignancy, I would recommend a hemilobectomy for definitive diagnosis, with a possibility of staged completion thyroidectomy if a follicular thyroid cancer or a large papillary cancer was confirmed. A central compartment lymph node dissection may also be performed if clinically indicated. I do not routinely perform total thyroidectomy if there is uncertainty about the cancer diagnosis.    -The procedure (hemithyroidectomy) takes anywhere from 1-4 hours and is done as an "outpatient." Sometimes I place a drain. Most patients are able to go home the same day of surgery. After surgery, all that we ask is that you avoid straining, including bending over to pick something up, for 10 days. Dr. Marie uses sutures and bandages that are removed in clinic a week after surgery.  -The risks of thyroidectomy include, but are not limited to, infection, bleeding, scarring, failure to achieve the diagnosis, no evidence of cancer, " recurrence, collection of blood or tissue fluid requiring drainage, injury to the recurrent laryngeal nerve with resultant temporary or permanent hoarseness (1% permanent risk with up to 10% temporary risk, greater in revision operations), injury to the superior laryngeal nerve with resultant loss of the upper register for singing or challenges with yelling, temporary or permanent hypocalcemia related to injury or devascularization of the parathyroid glands (less than 5% permanent, up to 30-60% when paratracheal dissection is accomplished, again greater in revision operations), and the need for additional procedures or therapies.       Since he does not want to proceed with surgery at this time. I did offer him to repeat the biopsy but he declined at this time. He verbalized understanding that he has a 50% chance of this being cancer but he does not want to proceed with surgery at this time. He will notify us with any changes and will come back sooner then a year. He will call us sooner if he decides to proceed with surgery.     Time was allowed for questions, and all questions were answered to the patient's apparent satisfaction.       Patient encouraged to call with any questions, concerns, or new or worsening symptoms.     Follow up if surgery desired, otherwise interval ultrasound and visit in 1 year to [email or web address]

## 2024-08-22 ENCOUNTER — HOSPITAL ENCOUNTER (OUTPATIENT)
Dept: RADIOLOGY | Facility: HOSPITAL | Age: 49
Discharge: HOME | End: 2024-08-22
Attending: NURSE PRACTITIONER
Payer: COMMERCIAL

## 2024-08-22 ENCOUNTER — OFFICE VISIT (OUTPATIENT)
Dept: HEMATOLOGY/ONCOLOGY | Facility: CLINIC | Age: 49
End: 2024-08-22
Payer: COMMERCIAL

## 2024-08-22 VITALS
WEIGHT: 178.81 LBS | TEMPERATURE: 98 F | SYSTOLIC BLOOD PRESSURE: 123 MMHG | RESPIRATION RATE: 16 BRPM | DIASTOLIC BLOOD PRESSURE: 83 MMHG | BODY MASS INDEX: 23.7 KG/M2 | HEIGHT: 73 IN | OXYGEN SATURATION: 97 % | HEART RATE: 75 BPM

## 2024-08-22 DIAGNOSIS — E04.1 LEFT THYROID NODULE: Primary | ICD-10-CM

## 2024-08-22 DIAGNOSIS — E04.1 LEFT THYROID NODULE: ICD-10-CM

## 2024-08-22 PROCEDURE — 76536 US EXAM OF HEAD AND NECK: CPT | Mod: TC,PO

## 2024-08-22 PROCEDURE — 76536 US EXAM OF HEAD AND NECK: CPT | Mod: 26,,, | Performed by: RADIOLOGY

## 2024-08-22 PROCEDURE — 99999 PR PBB SHADOW E&M-EST. PATIENT-LVL IV: CPT | Mod: PBBFAC,,, | Performed by: NURSE PRACTITIONER

## 2025-08-14 ENCOUNTER — TELEPHONE (OUTPATIENT)
Dept: HEMATOLOGY/ONCOLOGY | Facility: CLINIC | Age: 50
End: 2025-08-14

## 2025-08-14 DIAGNOSIS — E04.1 LEFT THYROID NODULE: Primary | ICD-10-CM

## 2025-08-25 ENCOUNTER — HOSPITAL ENCOUNTER (OUTPATIENT)
Dept: RADIOLOGY | Facility: HOSPITAL | Age: 50
Discharge: HOME | End: 2025-08-25
Attending: NURSE PRACTITIONER
Payer: COMMERCIAL

## 2025-08-25 ENCOUNTER — OFFICE VISIT (OUTPATIENT)
Dept: HEMATOLOGY/ONCOLOGY | Facility: CLINIC | Age: 50
End: 2025-08-25
Payer: COMMERCIAL

## 2025-08-25 VITALS
WEIGHT: 185.63 LBS | BODY MASS INDEX: 24.6 KG/M2 | HEIGHT: 73 IN | TEMPERATURE: 98 F | SYSTOLIC BLOOD PRESSURE: 140 MMHG | RESPIRATION RATE: 18 BRPM | OXYGEN SATURATION: 100 % | HEART RATE: 103 BPM | DIASTOLIC BLOOD PRESSURE: 88 MMHG

## 2025-08-25 DIAGNOSIS — E04.1 LEFT THYROID NODULE: ICD-10-CM

## 2025-08-25 DIAGNOSIS — E04.1 LEFT THYROID NODULE: Primary | ICD-10-CM

## 2025-08-25 PROCEDURE — 99213 OFFICE O/P EST LOW 20 MIN: CPT | Mod: S$GLB,,, | Performed by: NURSE PRACTITIONER

## 2025-08-25 PROCEDURE — 76536 US EXAM OF HEAD AND NECK: CPT | Mod: TC,PO

## 2025-08-25 PROCEDURE — 76536 US EXAM OF HEAD AND NECK: CPT | Mod: 26,,, | Performed by: STUDENT IN AN ORGANIZED HEALTH CARE EDUCATION/TRAINING PROGRAM

## 2025-08-25 PROCEDURE — 99999 PR PBB SHADOW E&M-EST. PATIENT-LVL V: CPT | Mod: PBBFAC,,, | Performed by: NURSE PRACTITIONER

## (undated) DEVICE — PAD ABD 8X10 STERILE

## (undated) DEVICE — DRAPE STERI U-SHAPED 47X51IN

## (undated) DEVICE — TUBING SUC UNIV W/CONN 12FT

## (undated) DEVICE — BLADE SURG CARBON STEEL SZ11

## (undated) DEVICE — BANDAGE MATRIX HK LOOP 6IN 5YD

## (undated) DEVICE — SOCKINETTE IMPERVIOUS 12X48IN

## (undated) DEVICE — NDL 22GA X1 1/2 REG BEVEL

## (undated) DEVICE — NDL SPINAL 18GX3.5 SPINOCAN

## (undated) DEVICE — DRAPE U SPLIT SHEET 54X76IN

## (undated) DEVICE — DRAPE EXTREMITY W/ABC NON-SLIP

## (undated) DEVICE — TOURNIQUET SB QC DP 34X4IN

## (undated) DEVICE — UNDERGLOVES BIOGEL PI SIZE 7.5

## (undated) DEVICE — BANDAGE ESMARK 6X12

## (undated) DEVICE — Device

## (undated) DEVICE — APPLICATOR CHLORAPREP ORN 26ML

## (undated) DEVICE — SUT ETHILON 3-0 PS2 18 BLK

## (undated) DEVICE — MAT SUCTION PUDDLEVAC ORANGE

## (undated) DEVICE — DRESSING N ADH OIL EMUL 3X3

## (undated) DEVICE — GLOVE SURGICAL LATEX SZ 8

## (undated) DEVICE — MANIFOLD 4 PORT

## (undated) DEVICE — GAUZE SPONGE 4X4 12PLY

## (undated) DEVICE — BNDG COFLEX FOAM LF2 ST 6X5YD

## (undated) DEVICE — BLADE GATOR 3.5 6 EACH/BOX

## (undated) DEVICE — PAD CAST SPECIALIST STRL 6

## (undated) DEVICE — GOWN SMARTGOWN LVL4 X-LONG XL

## (undated) DEVICE — SOL IRR NACL .9% 3000ML

## (undated) DEVICE — TUBE SET INFLOW/OUTFLOW